# Patient Record
Sex: FEMALE | Race: WHITE | Employment: UNEMPLOYED | URBAN - METROPOLITAN AREA
[De-identification: names, ages, dates, MRNs, and addresses within clinical notes are randomized per-mention and may not be internally consistent; named-entity substitution may affect disease eponyms.]

---

## 2018-05-02 ENCOUNTER — OFFICE VISIT (OUTPATIENT)
Dept: FAMILY MEDICINE CLINIC | Age: 68
End: 2018-05-02
Payer: MEDICARE

## 2018-05-02 VITALS
SYSTOLIC BLOOD PRESSURE: 124 MMHG | OXYGEN SATURATION: 96 % | BODY MASS INDEX: 26.4 KG/M2 | RESPIRATION RATE: 12 BRPM | DIASTOLIC BLOOD PRESSURE: 72 MMHG | HEIGHT: 66 IN | HEART RATE: 64 BPM | WEIGHT: 164.25 LBS | TEMPERATURE: 96.8 F

## 2018-05-02 DIAGNOSIS — Z01.89 ROUTINE LAB DRAW: ICD-10-CM

## 2018-05-02 DIAGNOSIS — M25.552 LEFT HIP PAIN: Primary | ICD-10-CM

## 2018-05-02 DIAGNOSIS — I49.3 PVC (PREMATURE VENTRICULAR CONTRACTION): ICD-10-CM

## 2018-05-02 DIAGNOSIS — Z13.820 SCREENING FOR OSTEOPOROSIS: ICD-10-CM

## 2018-05-02 DIAGNOSIS — Z13.220 SCREENING CHOLESTEROL LEVEL: ICD-10-CM

## 2018-05-02 DIAGNOSIS — M70.62 TROCHANTERIC BURSITIS OF LEFT HIP: ICD-10-CM

## 2018-05-02 DIAGNOSIS — Z12.31 ENCOUNTER FOR SCREENING MAMMOGRAM FOR BREAST CANCER: ICD-10-CM

## 2018-05-02 DIAGNOSIS — Z12.11 SCREEN FOR COLON CANCER: ICD-10-CM

## 2018-05-02 DIAGNOSIS — Z98.890 H/O PERCUTANEOUS LEFT HEART CATHETERIZATION: ICD-10-CM

## 2018-05-02 DIAGNOSIS — M81.0 AGE-RELATED OSTEOPOROSIS WITHOUT CURRENT PATHOLOGICAL FRACTURE: ICD-10-CM

## 2018-05-02 DIAGNOSIS — E78.5 DYSLIPIDEMIA: ICD-10-CM

## 2018-05-02 PROCEDURE — 3017F COLORECTAL CA SCREEN DOC REV: CPT | Performed by: FAMILY MEDICINE

## 2018-05-02 PROCEDURE — 99204 OFFICE O/P NEW MOD 45 MIN: CPT | Performed by: FAMILY MEDICINE

## 2018-05-02 PROCEDURE — G8427 DOCREV CUR MEDS BY ELIG CLIN: HCPCS | Performed by: FAMILY MEDICINE

## 2018-05-02 PROCEDURE — 1123F ACP DISCUSS/DSCN MKR DOCD: CPT | Performed by: FAMILY MEDICINE

## 2018-05-02 PROCEDURE — G8419 CALC BMI OUT NRM PARAM NOF/U: HCPCS | Performed by: FAMILY MEDICINE

## 2018-05-02 PROCEDURE — 1036F TOBACCO NON-USER: CPT | Performed by: FAMILY MEDICINE

## 2018-05-02 PROCEDURE — G8400 PT W/DXA NO RESULTS DOC: HCPCS | Performed by: FAMILY MEDICINE

## 2018-05-02 PROCEDURE — 1090F PRES/ABSN URINE INCON ASSESS: CPT | Performed by: FAMILY MEDICINE

## 2018-05-02 PROCEDURE — 4040F PNEUMOC VAC/ADMIN/RCVD: CPT | Performed by: FAMILY MEDICINE

## 2018-05-02 RX ORDER — MELOXICAM 15 MG/1
15 TABLET ORAL DAILY
Qty: 30 TABLET | Refills: 3 | Status: SHIPPED | OUTPATIENT
Start: 2018-05-02

## 2018-05-02 ASSESSMENT — PATIENT HEALTH QUESTIONNAIRE - PHQ9
SUM OF ALL RESPONSES TO PHQ9 QUESTIONS 1 & 2: 0
SUM OF ALL RESPONSES TO PHQ QUESTIONS 1-9: 0
1. LITTLE INTEREST OR PLEASURE IN DOING THINGS: 0
2. FEELING DOWN, DEPRESSED OR HOPELESS: 0

## 2018-05-07 PROBLEM — E78.5 DYSLIPIDEMIA: Status: ACTIVE | Noted: 2018-05-07

## 2018-05-07 PROBLEM — M70.62 TROCHANTERIC BURSITIS OF LEFT HIP: Status: ACTIVE | Noted: 2018-05-07

## 2018-05-17 ENCOUNTER — HOSPITAL ENCOUNTER (OUTPATIENT)
Dept: WOMENS IMAGING | Age: 68
Discharge: HOME | End: 2018-05-19
Payer: MEDICARE

## 2018-05-17 ENCOUNTER — HOSPITAL ENCOUNTER (OUTPATIENT)
Dept: LAB | Age: 68
Discharge: HOME | End: 2018-05-17
Payer: MEDICARE

## 2018-05-17 DIAGNOSIS — R92.8 ABNORMAL MAMMOGRAM: Primary | ICD-10-CM

## 2018-05-17 DIAGNOSIS — M81.0 AGE-RELATED OSTEOPOROSIS WITHOUT CURRENT PATHOLOGICAL FRACTURE: ICD-10-CM

## 2018-05-17 DIAGNOSIS — Z12.31 ENCOUNTER FOR SCREENING MAMMOGRAM FOR BREAST CANCER: ICD-10-CM

## 2018-05-17 LAB
ALBUMIN SERPL-MCNC: 4.6 G/DL (ref 3.9–4.9)
ALP BLD-CCNC: 85 U/L (ref 40–130)
ALT SERPL-CCNC: 23 U/L (ref 0–33)
ANION GAP SERPL CALCULATED.3IONS-SCNC: 18 MEQ/L (ref 7–13)
AST SERPL-CCNC: 20 U/L (ref 0–35)
BASOPHILS ABSOLUTE: 0 K/UL (ref 0–0.2)
BASOPHILS RELATIVE PERCENT: 0.7 %
BILIRUB SERPL-MCNC: 0.4 MG/DL (ref 0–1.2)
BUN BLDV-MCNC: 16 MG/DL (ref 8–23)
CALCIUM SERPL-MCNC: 9 MG/DL (ref 8.6–10.2)
CHLORIDE BLD-SCNC: 100 MEQ/L (ref 98–107)
CHOLESTEROL, TOTAL: 241 MG/DL (ref 0–199)
CO2: 23 MEQ/L (ref 22–29)
CREAT SERPL-MCNC: 0.59 MG/DL (ref 0.5–0.9)
EOSINOPHILS ABSOLUTE: 0.1 K/UL (ref 0–0.7)
EOSINOPHILS RELATIVE PERCENT: 1.6 %
GFR AFRICAN AMERICAN: >60
GFR NON-AFRICAN AMERICAN: >60
GLOBULIN: 2.4 G/DL (ref 2.3–3.5)
GLUCOSE BLD-MCNC: 74 MG/DL (ref 74–109)
HCT VFR BLD CALC: 42.9 % (ref 37–47)
HDLC SERPL-MCNC: 81 MG/DL (ref 40–59)
HEMOGLOBIN: 14.4 G/DL (ref 12–16)
LDL CHOLESTEROL CALCULATED: 148 MG/DL (ref 0–129)
LYMPHOCYTES ABSOLUTE: 1.4 K/UL (ref 1–4.8)
LYMPHOCYTES RELATIVE PERCENT: 22.8 %
MCH RBC QN AUTO: 30.7 PG (ref 27–31.3)
MCHC RBC AUTO-ENTMCNC: 33.5 % (ref 33–37)
MCV RBC AUTO: 91.7 FL (ref 82–100)
MONOCYTES ABSOLUTE: 0.5 K/UL (ref 0.2–0.8)
MONOCYTES RELATIVE PERCENT: 7.7 %
NEUTROPHILS ABSOLUTE: 4.3 K/UL (ref 1.4–6.5)
NEUTROPHILS RELATIVE PERCENT: 67.2 %
PDW BLD-RTO: 13.4 % (ref 11.5–14.5)
PLATELET # BLD: 255 K/UL (ref 130–400)
POTASSIUM SERPL-SCNC: 4.1 MEQ/L (ref 3.5–5.1)
RBC # BLD: 4.67 M/UL (ref 4.2–5.4)
SODIUM BLD-SCNC: 141 MEQ/L (ref 132–144)
TOTAL PROTEIN: 7 G/DL (ref 6.4–8.1)
TRIGL SERPL-MCNC: 60 MG/DL (ref 0–200)
WBC # BLD: 6.3 K/UL (ref 4.8–10.8)

## 2018-05-17 PROCEDURE — 85025 COMPLETE CBC W/AUTO DIFF WBC: CPT

## 2018-05-17 PROCEDURE — 80061 LIPID PANEL: CPT

## 2018-05-17 PROCEDURE — 80053 COMPREHEN METABOLIC PANEL: CPT

## 2018-05-17 PROCEDURE — 77080 DXA BONE DENSITY AXIAL: CPT

## 2018-05-17 PROCEDURE — 77067 SCR MAMMO BI INCL CAD: CPT

## 2018-05-21 DIAGNOSIS — Z12.11 COLON CANCER SCREENING: Primary | ICD-10-CM

## 2018-05-24 ENCOUNTER — OFFICE VISIT (OUTPATIENT)
Dept: FAMILY MEDICINE CLINIC | Age: 68
End: 2018-05-24
Payer: MEDICARE

## 2018-05-24 VITALS
DIASTOLIC BLOOD PRESSURE: 70 MMHG | HEART RATE: 58 BPM | HEIGHT: 66 IN | WEIGHT: 168.3 LBS | TEMPERATURE: 96.6 F | BODY MASS INDEX: 27.05 KG/M2 | OXYGEN SATURATION: 96 % | SYSTOLIC BLOOD PRESSURE: 110 MMHG

## 2018-05-24 DIAGNOSIS — M70.62 TROCHANTERIC BURSITIS OF LEFT HIP: Primary | ICD-10-CM

## 2018-05-24 PROCEDURE — 1036F TOBACCO NON-USER: CPT | Performed by: FAMILY MEDICINE

## 2018-05-24 PROCEDURE — 4040F PNEUMOC VAC/ADMIN/RCVD: CPT | Performed by: FAMILY MEDICINE

## 2018-05-24 PROCEDURE — G8419 CALC BMI OUT NRM PARAM NOF/U: HCPCS | Performed by: FAMILY MEDICINE

## 2018-05-24 PROCEDURE — 1123F ACP DISCUSS/DSCN MKR DOCD: CPT | Performed by: FAMILY MEDICINE

## 2018-05-24 PROCEDURE — 1090F PRES/ABSN URINE INCON ASSESS: CPT | Performed by: FAMILY MEDICINE

## 2018-05-24 PROCEDURE — 3017F COLORECTAL CA SCREEN DOC REV: CPT | Performed by: FAMILY MEDICINE

## 2018-05-24 PROCEDURE — G8399 PT W/DXA RESULTS DOCUMENT: HCPCS | Performed by: FAMILY MEDICINE

## 2018-05-24 PROCEDURE — G8427 DOCREV CUR MEDS BY ELIG CLIN: HCPCS | Performed by: FAMILY MEDICINE

## 2018-05-24 PROCEDURE — 99213 OFFICE O/P EST LOW 20 MIN: CPT | Performed by: FAMILY MEDICINE

## 2018-06-11 ENCOUNTER — APPOINTMENT (OUTPATIENT)
Dept: ULTRASOUND IMAGING | Age: 68
End: 2018-06-11
Payer: MEDICARE

## 2018-06-11 ENCOUNTER — HOSPITAL ENCOUNTER (OUTPATIENT)
Dept: WOMENS IMAGING | Age: 68
Discharge: HOME | End: 2018-06-13
Payer: MEDICARE

## 2018-06-11 DIAGNOSIS — R92.8 ABNORMAL MAMMOGRAM: ICD-10-CM

## 2018-06-11 PROCEDURE — 77065 DX MAMMO INCL CAD UNI: CPT

## 2018-06-27 ENCOUNTER — OFFICE VISIT (OUTPATIENT)
Dept: FAMILY MEDICINE CLINIC | Age: 68
End: 2018-06-27
Payer: MEDICARE

## 2018-06-27 VITALS
TEMPERATURE: 96.6 F | SYSTOLIC BLOOD PRESSURE: 122 MMHG | OXYGEN SATURATION: 97 % | BODY MASS INDEX: 27.08 KG/M2 | WEIGHT: 168.5 LBS | HEIGHT: 66 IN | HEART RATE: 68 BPM | RESPIRATION RATE: 12 BRPM | DIASTOLIC BLOOD PRESSURE: 66 MMHG

## 2018-06-27 DIAGNOSIS — L03.116 LEFT LEG CELLULITIS: Primary | ICD-10-CM

## 2018-06-27 DIAGNOSIS — R10.32 LEFT INGUINAL PAIN: ICD-10-CM

## 2018-06-27 LAB
BILIRUBIN, POC: NORMAL
BLOOD URINE, POC: NORMAL
CLARITY, POC: CLEAR
COLOR, POC: NORMAL
GLUCOSE URINE, POC: NORMAL
KETONES, POC: NORMAL
LEUKOCYTE EST, POC: NORMAL
NITRITE, POC: NORMAL
PH, POC: 6
PROTEIN, POC: NORMAL
SPECIFIC GRAVITY, POC: 1.02
UROBILINOGEN, POC: NORMAL

## 2018-06-27 PROCEDURE — 1090F PRES/ABSN URINE INCON ASSESS: CPT | Performed by: FAMILY MEDICINE

## 2018-06-27 PROCEDURE — 99213 OFFICE O/P EST LOW 20 MIN: CPT | Performed by: FAMILY MEDICINE

## 2018-06-27 PROCEDURE — 1123F ACP DISCUSS/DSCN MKR DOCD: CPT | Performed by: FAMILY MEDICINE

## 2018-06-27 PROCEDURE — G8419 CALC BMI OUT NRM PARAM NOF/U: HCPCS | Performed by: FAMILY MEDICINE

## 2018-06-27 PROCEDURE — 3017F COLORECTAL CA SCREEN DOC REV: CPT | Performed by: FAMILY MEDICINE

## 2018-06-27 PROCEDURE — G8399 PT W/DXA RESULTS DOCUMENT: HCPCS | Performed by: FAMILY MEDICINE

## 2018-06-27 PROCEDURE — 4040F PNEUMOC VAC/ADMIN/RCVD: CPT | Performed by: FAMILY MEDICINE

## 2018-06-27 PROCEDURE — 81003 URINALYSIS AUTO W/O SCOPE: CPT | Performed by: FAMILY MEDICINE

## 2018-06-27 PROCEDURE — 1036F TOBACCO NON-USER: CPT | Performed by: FAMILY MEDICINE

## 2018-06-27 PROCEDURE — G8427 DOCREV CUR MEDS BY ELIG CLIN: HCPCS | Performed by: FAMILY MEDICINE

## 2018-06-27 RX ORDER — CEPHALEXIN 500 MG/1
500 CAPSULE ORAL 3 TIMES DAILY
Qty: 30 CAPSULE | Refills: 0 | Status: SHIPPED | OUTPATIENT
Start: 2018-06-27 | End: 2018-07-07

## 2018-09-06 ENCOUNTER — OFFICE VISIT (OUTPATIENT)
Dept: FAMILY MEDICINE CLINIC | Age: 68
End: 2018-09-06
Payer: MEDICARE

## 2018-09-06 VITALS
HEIGHT: 66 IN | DIASTOLIC BLOOD PRESSURE: 80 MMHG | TEMPERATURE: 96.6 F | SYSTOLIC BLOOD PRESSURE: 122 MMHG | RESPIRATION RATE: 12 BRPM | BODY MASS INDEX: 25.72 KG/M2 | HEART RATE: 66 BPM | OXYGEN SATURATION: 97 % | WEIGHT: 160.06 LBS

## 2018-09-06 DIAGNOSIS — E78.5 DYSLIPIDEMIA: ICD-10-CM

## 2018-09-06 DIAGNOSIS — R00.2 PALPITATIONS: ICD-10-CM

## 2018-09-06 DIAGNOSIS — R00.2 PALPITATIONS: Primary | ICD-10-CM

## 2018-09-06 LAB
T4 FREE: 1.35 NG/DL (ref 0.93–1.7)
TSH SERPL DL<=0.05 MIU/L-ACNC: 1.52 UIU/ML (ref 0.27–4.2)

## 2018-09-06 PROCEDURE — 1036F TOBACCO NON-USER: CPT | Performed by: FAMILY MEDICINE

## 2018-09-06 PROCEDURE — 1090F PRES/ABSN URINE INCON ASSESS: CPT | Performed by: FAMILY MEDICINE

## 2018-09-06 PROCEDURE — 93000 ELECTROCARDIOGRAM COMPLETE: CPT | Performed by: FAMILY MEDICINE

## 2018-09-06 PROCEDURE — 99214 OFFICE O/P EST MOD 30 MIN: CPT | Performed by: FAMILY MEDICINE

## 2018-09-06 PROCEDURE — 1101F PT FALLS ASSESS-DOCD LE1/YR: CPT | Performed by: FAMILY MEDICINE

## 2018-09-06 PROCEDURE — G8427 DOCREV CUR MEDS BY ELIG CLIN: HCPCS | Performed by: FAMILY MEDICINE

## 2018-09-06 PROCEDURE — G8419 CALC BMI OUT NRM PARAM NOF/U: HCPCS | Performed by: FAMILY MEDICINE

## 2018-09-06 PROCEDURE — 1123F ACP DISCUSS/DSCN MKR DOCD: CPT | Performed by: FAMILY MEDICINE

## 2018-09-06 PROCEDURE — 4040F PNEUMOC VAC/ADMIN/RCVD: CPT | Performed by: FAMILY MEDICINE

## 2018-09-06 PROCEDURE — 3017F COLORECTAL CA SCREEN DOC REV: CPT | Performed by: FAMILY MEDICINE

## 2018-09-06 PROCEDURE — G8399 PT W/DXA RESULTS DOCUMENT: HCPCS | Performed by: FAMILY MEDICINE

## 2018-09-06 NOTE — PROGRESS NOTES
Subjective:      Patient ID: Sofiya Santiago is a 78 y.o. female. Chief Complaint   Patient presents with    Palpitations     She reports this is an ongoing issue for her entire life. History of PVCs. This episode onset  was about 3 weeks ago. Ocurred intermittently and has since resolved. Denies chest pain. Has one small episode of dizziness. HPI  Here today for palpitations she reports as being an ongoing issue for her entire life she had a history of PVCs in the past but seems to feel the morning last 3-4 weeks but occur intermittently but lately has been more common denies any chest habitus    Never wants to get small episodes of dizziness but nothing regularly         Eating drinking well taking all same medications that she has been not doing any Sudafed cough and cold          Allergies   Allergen Reactions    Codeine Nausea Only     Outpatient Encounter Prescriptions as of 9/6/2018   Medication Sig Dispense Refill    fluocinonide (LIDEX) 0.05 % cream       Boswellia Octavia (BOSWELLIA PO) Take 1 tablet by mouth daily      Multiple Vitamins-Minerals (MULTIVITAMIN GUMMIES ADULT PO) Take 2 each by mouth daily      Iodine, Kelp, (KELP PO) Take 1 capsule by mouth daily      TURMERIC PO Take 1 tablet by mouth daily      Glucosamine-MSM-Hyaluronic Acd (JOINT HEALTH PO) Take 1 tablet by mouth daily      Calcium Citrate-Vitamin D (CALCIUM CITRATE + D PO) Take 1 tablet by mouth daily      meloxicam (MOBIC) 15 MG tablet Take 1 tablet by mouth daily 30 tablet 3    [DISCONTINUED] UNABLE TO FIND Bladderwrack 580 mg capsule. 1 daily       No facility-administered encounter medications on file as of 9/6/2018. Social History     Social History    Marital status:      Spouse name: N/A    Number of children: N/A    Years of education: N/A     Occupational History    Not on file.      Social History Main Topics    Smoking status: Never Smoker    Smokeless tobacco: Never Used    Alcohol use No Skin- no lesions noted       Assessment:       Diagnosis Orders   1. Palpitations  EKG 12 lead unit performed    TSH without Reflex    T4, Free   2. Dyslipidemia               Plan:        No orders of the defined types were placed in this encounter. Orders Placed This Encounter   Procedures    TSH without Reflex     Standing Status:   Future     Number of Occurrences:   1     Standing Expiration Date:   9/6/2019    T4, Free     Standing Status:   Future     Number of Occurrences:   1     Standing Expiration Date:   9/6/2019    EKG 12 lead unit performed     Order Specific Question:   Reason for Exam?     Answer:   Irregular heart rate           Health Maintenance Due   Topic Date Due    Hepatitis C screen  1950    Flu vaccine (1) 09/01/2018        EKG looks okay we'll check her thyroid status      And also like her to add some magnesium 400 mg at night so she doesn't this things worsen or change she'll let us know         Controlled Substances Monitoring:     No flowsheet data found.         If anything worsens or changes please call us at once , follow-up in the office as planned,

## 2019-04-24 ENCOUNTER — TELEPHONE (OUTPATIENT)
Dept: FAMILY MEDICINE CLINIC | Age: 69
End: 2019-04-24

## 2019-04-24 ENCOUNTER — TELEPHONE (OUTPATIENT)
Dept: ADMINISTRATIVE | Age: 69
End: 2019-04-24

## 2019-04-24 NOTE — TELEPHONE ENCOUNTER
Spoke with patient. Pt. received letter regarding doctor leaving [facility]. Pt. is following doctor to [facility]. Pt. asked for me to mail a release of medical information. I did this today.

## 2019-08-15 ENCOUNTER — HOSPITAL ENCOUNTER (OUTPATIENT)
Dept: CARDIAC CATH/INVASIVE PROCEDURES | Age: 69
Discharge: HOME | End: 2019-08-15
Attending: INTERNAL MEDICINE | Admitting: INTERNAL MEDICINE
Payer: MEDICARE

## 2019-08-15 PROCEDURE — 2580000003 HC RX 258: Performed by: INTERNAL MEDICINE

## 2019-08-15 PROCEDURE — 6370000000 HC RX 637 (ALT 250 FOR IP)

## 2019-08-15 PROCEDURE — 33285 INSJ SUBQ CAR RHYTHM MNTR: CPT | Performed by: INTERNAL MEDICINE

## 2019-08-15 PROCEDURE — C1764 EVENT RECORDER, CARDIAC: HCPCS

## 2019-08-15 PROCEDURE — 6360000002 HC RX W HCPCS

## 2019-08-15 PROCEDURE — 6360000002 HC RX W HCPCS: Performed by: INTERNAL MEDICINE

## 2019-08-15 RX ORDER — CALCIUM CARBONATE 260MG(650)
1 TABLET,CHEWABLE ORAL DAILY
COMMUNITY

## 2019-08-15 RX ORDER — ACETAMINOPHEN 325 MG/1
650 TABLET ORAL EVERY 4 HOURS PRN
Status: DISCONTINUED | OUTPATIENT
Start: 2019-08-15 | End: 2019-08-15 | Stop reason: HOSPADM

## 2019-08-15 RX ORDER — SODIUM CHLORIDE 0.9 % (FLUSH) 0.9 %
10 SYRINGE (ML) INJECTION PRN
Status: DISCONTINUED | OUTPATIENT
Start: 2019-08-15 | End: 2019-08-15 | Stop reason: HOSPADM

## 2019-08-15 RX ORDER — SODIUM CHLORIDE 9 MG/ML
INJECTION, SOLUTION INTRAVENOUS CONTINUOUS
Status: DISCONTINUED | OUTPATIENT
Start: 2019-08-15 | End: 2019-08-15 | Stop reason: HOSPADM

## 2019-08-15 RX ORDER — SODIUM CHLORIDE 0.9 % (FLUSH) 0.9 %
10 SYRINGE (ML) INJECTION EVERY 12 HOURS SCHEDULED
Status: DISCONTINUED | OUTPATIENT
Start: 2019-08-15 | End: 2019-08-15 | Stop reason: HOSPADM

## 2019-08-15 RX ORDER — ONDANSETRON 2 MG/ML
4 INJECTION INTRAMUSCULAR; INTRAVENOUS EVERY 6 HOURS PRN
Status: DISCONTINUED | OUTPATIENT
Start: 2019-08-15 | End: 2019-08-15 | Stop reason: HOSPADM

## 2019-08-15 RX ORDER — B-COMPLEX WITH VITAMIN C
1 TABLET ORAL DAILY
COMMUNITY

## 2019-08-15 RX ADMIN — VANCOMYCIN HYDROCHLORIDE 1000 MG: 1 INJECTION, POWDER, LYOPHILIZED, FOR SOLUTION INTRAVENOUS at 10:38

## 2019-08-15 RX ADMIN — SODIUM CHLORIDE: 9 INJECTION, SOLUTION INTRAVENOUS at 07:01

## 2019-08-15 NOTE — PROGRESS NOTES
Vancomycin finished. Dressing remains dry and intact with no bleeding or hematoma.   Patient ready for discharge

## 2019-08-15 NOTE — PROGRESS NOTES
Arrived to pre/post from the cath lab and report from [facility]. Mid sternal chest dressing dry and intact with no bleeding or hematoma. Attached to monitor. medtronic at bedside going over equipment at bedside. Family at bedside and Dr. Kirk Plummer reviewed results with patient and family. Taking food and fluids.   Will continue to monitor

## 2019-08-15 NOTE — H&P
[address]                      [address], [address] [facility]                              HISTORY AND PHYSICAL    PATIENT NAME: Gordo Chamorro                     :        1950  MED REC NO:   [id number]                            ROOM:  ACCOUNT NO:   [de-identified]                           ADMIT DATE: 08/15/2019  PROVIDER:     Earnest Foster MD    HISTORY OF PRESENT ILLNESS:  A 71-year-old female with a past medical  history of _____ for heart disease. She got three episodes of syncope  in the last five years. In , she had an episode, where she was  eating lunch and then she fell on the floor and passed out hitting her  head. She was hospitalized and taken by ambulance to the hospital,  underwent an echocardiogram, necessitating cardiac catheterization,  where all within normal limits except for minimal disease with one based  on the cardiac catheterization. In , she experienced some vertigo  and dizziness with nausea, but did not pass out. Most recently in  2019, was sitting at the dinner table and breathing while she felt  woozy and she was able to get up and go to a living room, where she laid  on the couch and she fell asleep and when she awakened, her symptoms  were resolved. Otherwise, she has been active. EKG on admission shows  sinus rhythm.  _____ show a 15-beat of what looks like a wide complex  tachycardia, felt to be either atrial tachycardia or atrial fibrillation  with aberrancy at a rate of 127 beats per minute. REVIEW OF SYSTEMS:  Per HPI. The rest of them were negative. PAST MEDICAL HISTORY:  As described above. FAMILY HISTORY:  Positive for hypertension, myocardial infarction. SOCIAL HISTORY:  Never a smoker. Occasional alcohol. No illicit drugs. MEDICATIONS:  Please see MAR. ALLERGIES:  To CODEINE.     PHYSICAL EXAMINATION:  VITAL SIGNS:  Blood pressure of 138/70 mmHg, heart rate of 56 beats per  minute,

## 2019-08-15 NOTE — PROCEDURES
patient left the EP laboratory in stable condition. COMPLICATIONS:  The patient tolerated the procedure without any  complications or incidents. No complications occurred. SUMMARY:  Successful implantation of a loop recorder. FOLLOWUP:  1. The patient will be discharged on the same day of the procedure if  recovery parameters are appropriate. 2.  Follow up with the device clinic as scheduled. 3.  Follow up in our office in seven days for wound assessment. The  patient was instructed for bleeding, swelling, or signs of infection.       Minnie Mccray MD    D: 08/15/2019 17:07:37       T: 08/15/2019 18:09:05     [email or web address]  Job#: [id number]     Doc#: [id number]    CC:

## 2019-11-13 ENCOUNTER — HOSPITAL ENCOUNTER (OUTPATIENT)
Dept: CARDIOLOGY | Age: 69
Discharge: HOME | End: 2019-11-13
Payer: MEDICARE

## 2019-11-13 ENCOUNTER — HOSPITAL ENCOUNTER (OUTPATIENT)
Dept: CARDIAC CATH/INVASIVE PROCEDURES | Age: 69
Discharge: HOME | End: 2019-11-13
Attending: INTERNAL MEDICINE | Admitting: INTERNAL MEDICINE
Payer: MEDICARE

## 2019-11-13 VITALS
SYSTOLIC BLOOD PRESSURE: 115 MMHG | DIASTOLIC BLOOD PRESSURE: 60 MMHG | HEIGHT: 68 IN | WEIGHT: 160 LBS | BODY MASS INDEX: 24.25 KG/M2 | HEART RATE: 65 BPM | RESPIRATION RATE: 16 BRPM | OXYGEN SATURATION: 96 %

## 2019-11-13 PROBLEM — I47.20 VT (VENTRICULAR TACHYCARDIA): Status: ACTIVE | Noted: 2019-11-13

## 2019-11-13 LAB
ANION GAP SERPL CALCULATED.3IONS-SCNC: 10 MEQ/L (ref 9–15)
BUN BLDV-MCNC: 17 MG/DL (ref 8–23)
CALCIUM SERPL-MCNC: 9.6 MG/DL (ref 8.5–9.9)
CHLORIDE BLD-SCNC: 102 MEQ/L (ref 95–107)
CO2: 26 MEQ/L (ref 20–31)
CREAT SERPL-MCNC: 0.49 MG/DL (ref 0.5–0.9)
EKG ATRIAL RATE: 62 BPM
EKG P AXIS: 61 DEGREES
EKG P-R INTERVAL: 182 MS
EKG Q-T INTERVAL: 470 MS
EKG QRS DURATION: 86 MS
EKG QTC CALCULATION (BAZETT): 477 MS
EKG R AXIS: 30 DEGREES
EKG T AXIS: 37 DEGREES
EKG VENTRICULAR RATE: 62 BPM
GFR AFRICAN AMERICAN: >60
GFR NON-AFRICAN AMERICAN: >60
GLUCOSE BLD-MCNC: 89 MG/DL (ref 70–99)
HCT VFR BLD CALC: 41.3 % (ref 37–47)
HEMOGLOBIN: 14.1 G/DL (ref 12–16)
MCH RBC QN AUTO: 31.1 PG (ref 27–31.3)
MCHC RBC AUTO-ENTMCNC: 34.1 % (ref 33–37)
MCV RBC AUTO: 91.4 FL (ref 82–100)
PDW BLD-RTO: 13.5 % (ref 11.5–14.5)
PLATELET # BLD: 260 K/UL (ref 130–400)
POTASSIUM SERPL-SCNC: 3.9 MEQ/L (ref 3.4–4.9)
RBC # BLD: 4.52 M/UL (ref 4.2–5.4)
SODIUM BLD-SCNC: 138 MEQ/L (ref 135–144)
WBC # BLD: 6.2 K/UL (ref 4.8–10.8)

## 2019-11-13 PROCEDURE — C1760 CLOSURE DEV, VASC: HCPCS

## 2019-11-13 PROCEDURE — 6360000004 HC RX CONTRAST MEDICATION: Performed by: INTERNAL MEDICINE

## 2019-11-13 PROCEDURE — 80048 BASIC METABOLIC PNL TOTAL CA: CPT

## 2019-11-13 PROCEDURE — 2580000003 HC RX 258

## 2019-11-13 PROCEDURE — 93299 HC INTERROG REMOTE LOOP RECORDER: CPT

## 2019-11-13 PROCEDURE — C1769 GUIDE WIRE: HCPCS

## 2019-11-13 PROCEDURE — 93458 L HRT ARTERY/VENTRICLE ANGIO: CPT | Performed by: INTERNAL MEDICINE

## 2019-11-13 PROCEDURE — 6360000002 HC RX W HCPCS

## 2019-11-13 PROCEDURE — 85027 COMPLETE CBC AUTOMATED: CPT

## 2019-11-13 PROCEDURE — 2500000003 HC RX 250 WO HCPCS

## 2019-11-13 PROCEDURE — C1894 INTRO/SHEATH, NON-LASER: HCPCS

## 2019-11-13 PROCEDURE — 2580000003 HC RX 258: Performed by: INTERNAL MEDICINE

## 2019-11-13 PROCEDURE — 2709999900 HC NON-CHARGEABLE SUPPLY

## 2019-11-13 RX ORDER — SODIUM CHLORIDE 0.9 % (FLUSH) 0.9 %
10 SYRINGE (ML) INJECTION PRN
Status: CANCELLED | OUTPATIENT
Start: 2019-11-13

## 2019-11-13 RX ORDER — SODIUM CHLORIDE 9 MG/ML
INJECTION, SOLUTION INTRAVENOUS CONTINUOUS
Status: DISCONTINUED | OUTPATIENT
Start: 2019-11-13 | End: 2019-11-13 | Stop reason: HOSPADM

## 2019-11-13 RX ORDER — SODIUM CHLORIDE 0.9 % (FLUSH) 0.9 %
10 SYRINGE (ML) INJECTION EVERY 12 HOURS SCHEDULED
Status: DISCONTINUED | OUTPATIENT
Start: 2019-11-13 | End: 2019-11-13 | Stop reason: HOSPADM

## 2019-11-13 RX ORDER — ACETAMINOPHEN 325 MG/1
650 TABLET ORAL EVERY 4 HOURS PRN
Status: CANCELLED | OUTPATIENT
Start: 2019-11-13

## 2019-11-13 RX ORDER — ALPRAZOLAM 0.5 MG/1
0.5 TABLET ORAL
Status: DISCONTINUED | OUTPATIENT
Start: 2019-11-13 | End: 2019-11-13 | Stop reason: HOSPADM

## 2019-11-13 RX ORDER — SODIUM CHLORIDE 0.9 % (FLUSH) 0.9 %
10 SYRINGE (ML) INJECTION EVERY 12 HOURS SCHEDULED
Status: CANCELLED | OUTPATIENT
Start: 2019-11-13

## 2019-11-13 RX ADMIN — IOPAMIDOL 80 ML: 612 INJECTION, SOLUTION INTRAVENOUS at 14:25

## 2019-11-13 RX ADMIN — SODIUM CHLORIDE: 9 INJECTION, SOLUTION INTRAVENOUS at 12:16

## 2020-01-27 ENCOUNTER — APPOINTMENT (OUTPATIENT)
Dept: GENERAL RADIOLOGY | Age: 70
End: 2020-01-27
Attending: INTERNAL MEDICINE
Payer: MEDICARE

## 2020-01-27 ENCOUNTER — HOSPITAL ENCOUNTER (OUTPATIENT)
Dept: CARDIAC CATH/INVASIVE PROCEDURES | Age: 70
Setting detail: OBSERVATION
LOS: 1 days | Discharge: HOME | End: 2020-01-28
Attending: INTERNAL MEDICINE | Admitting: INTERNAL MEDICINE
Payer: MEDICARE

## 2020-01-27 PROCEDURE — 2580000003 HC RX 258

## 2020-01-27 PROCEDURE — 93620 COMP EP EVL R AT VEN PAC&REC: CPT | Performed by: INTERNAL MEDICINE

## 2020-01-27 PROCEDURE — 93622 COMP EP EVAL L VENTR PAC&REC: CPT | Performed by: INTERNAL MEDICINE

## 2020-01-27 PROCEDURE — C1894 INTRO/SHEATH, NON-LASER: HCPCS

## 2020-01-27 PROCEDURE — 6360000002 HC RX W HCPCS

## 2020-01-27 PROCEDURE — G0378 HOSPITAL OBSERVATION PER HR: HCPCS

## 2020-01-27 PROCEDURE — 93005 ELECTROCARDIOGRAM TRACING: CPT | Performed by: INTERNAL MEDICINE

## 2020-01-27 PROCEDURE — 2709999900 HC NON-CHARGEABLE SUPPLY

## 2020-01-27 PROCEDURE — C1721 AICD, DUAL CHAMBER: HCPCS

## 2020-01-27 PROCEDURE — 2500000003 HC RX 250 WO HCPCS

## 2020-01-27 PROCEDURE — 33249 INSJ/RPLCMT DEFIB W/LEAD(S): CPT | Performed by: INTERNAL MEDICINE

## 2020-01-27 PROCEDURE — 71045 X-RAY EXAM CHEST 1 VIEW: CPT

## 2020-01-27 PROCEDURE — C1730 CATH, EP, 19 OR FEW ELECT: HCPCS

## 2020-01-27 PROCEDURE — C1777 LEAD, AICD, ENDO SINGLE COIL: HCPCS

## 2020-01-27 PROCEDURE — 2780000010 HC IMPLANT OTHER

## 2020-01-27 PROCEDURE — 6370000000 HC RX 637 (ALT 250 FOR IP)

## 2020-01-27 PROCEDURE — 2580000003 HC RX 258: Performed by: INTERNAL MEDICINE

## 2020-01-27 PROCEDURE — C1898 LEAD, PMKR, OTHER THAN TRANS: HCPCS

## 2020-01-27 RX ORDER — ONDANSETRON 2 MG/ML
4 INJECTION INTRAMUSCULAR; INTRAVENOUS EVERY 6 HOURS PRN
Status: DISCONTINUED | OUTPATIENT
Start: 2020-01-27 | End: 2020-01-28 | Stop reason: HOSPADM

## 2020-01-27 RX ORDER — CALCIUM CARBONATE 260MG(650)
1 TABLET,CHEWABLE ORAL DAILY
Status: DISCONTINUED | OUTPATIENT
Start: 2020-01-28 | End: 2020-01-28 | Stop reason: HOSPADM

## 2020-01-27 RX ORDER — KETOROLAC TROMETHAMINE 15 MG/ML
15 INJECTION, SOLUTION INTRAMUSCULAR; INTRAVENOUS EVERY 6 HOURS
Status: DISCONTINUED | OUTPATIENT
Start: 2020-01-27 | End: 2020-01-28 | Stop reason: HOSPADM

## 2020-01-27 RX ORDER — SODIUM CHLORIDE 9 MG/ML
INJECTION, SOLUTION INTRAVENOUS CONTINUOUS
Status: DISCONTINUED | OUTPATIENT
Start: 2020-01-27 | End: 2020-01-28 | Stop reason: HOSPADM

## 2020-01-27 RX ORDER — SODIUM CHLORIDE 0.9 % (FLUSH) 0.9 %
10 SYRINGE (ML) INJECTION PRN
Status: DISCONTINUED | OUTPATIENT
Start: 2020-01-27 | End: 2020-01-28 | Stop reason: HOSPADM

## 2020-01-27 RX ORDER — VITAMIN B COMPLEX
1 CAPSULE ORAL DAILY
Status: DISCONTINUED | OUTPATIENT
Start: 2020-01-28 | End: 2020-01-28 | Stop reason: HOSPADM

## 2020-01-27 RX ORDER — SODIUM CHLORIDE 0.9 % (FLUSH) 0.9 %
10 SYRINGE (ML) INJECTION EVERY 12 HOURS SCHEDULED
Status: DISCONTINUED | OUTPATIENT
Start: 2020-01-27 | End: 2020-01-28 | Stop reason: HOSPADM

## 2020-01-27 RX ORDER — ACETAMINOPHEN 325 MG/1
650 TABLET ORAL EVERY 4 HOURS PRN
Status: DISCONTINUED | OUTPATIENT
Start: 2020-01-27 | End: 2020-01-28 | Stop reason: HOSPADM

## 2020-01-27 RX ORDER — MELOXICAM 7.5 MG/1
15 TABLET ORAL DAILY
Status: DISCONTINUED | OUTPATIENT
Start: 2020-01-29 | End: 2020-01-28 | Stop reason: HOSPADM

## 2020-01-27 RX ADMIN — SODIUM CHLORIDE: 9 INJECTION, SOLUTION INTRAVENOUS at 13:28

## 2020-01-27 ASSESSMENT — PAIN SCALES - GENERAL: PAINLEVEL_OUTOF10: 0

## 2020-01-28 ENCOUNTER — APPOINTMENT (OUTPATIENT)
Dept: GENERAL RADIOLOGY | Age: 70
End: 2020-01-28
Attending: INTERNAL MEDICINE
Payer: MEDICARE

## 2020-01-28 VITALS
WEIGHT: 167 LBS | RESPIRATION RATE: 16 BRPM | TEMPERATURE: 97.3 F | SYSTOLIC BLOOD PRESSURE: 128 MMHG | BODY MASS INDEX: 24.73 KG/M2 | OXYGEN SATURATION: 97 % | HEIGHT: 69 IN | DIASTOLIC BLOOD PRESSURE: 48 MMHG | HEART RATE: 70 BPM

## 2020-01-28 LAB
EKG ATRIAL RATE: 66 BPM
EKG ATRIAL RATE: 68 BPM
EKG P AXIS: 43 DEGREES
EKG P AXIS: 77 DEGREES
EKG P-R INTERVAL: 168 MS
EKG P-R INTERVAL: 194 MS
EKG Q-T INTERVAL: 442 MS
EKG Q-T INTERVAL: 452 MS
EKG QRS DURATION: 84 MS
EKG QRS DURATION: 92 MS
EKG QTC CALCULATION (BAZETT): 469 MS
EKG QTC CALCULATION (BAZETT): 473 MS
EKG R AXIS: 26 DEGREES
EKG R AXIS: 61 DEGREES
EKG T AXIS: 65 DEGREES
EKG T AXIS: 78 DEGREES
EKG VENTRICULAR RATE: 66 BPM
EKG VENTRICULAR RATE: 68 BPM

## 2020-01-28 PROCEDURE — 96374 THER/PROPH/DIAG INJ IV PUSH: CPT

## 2020-01-28 PROCEDURE — 93283 PRGRMG EVAL IMPLANTABLE DFB: CPT

## 2020-01-28 PROCEDURE — 71046 X-RAY EXAM CHEST 2 VIEWS: CPT

## 2020-01-28 PROCEDURE — 93005 ELECTROCARDIOGRAM TRACING: CPT | Performed by: INTERNAL MEDICINE

## 2020-01-28 PROCEDURE — C1777 LEAD, AICD, ENDO SINGLE COIL: HCPCS

## 2020-01-28 PROCEDURE — 2580000003 HC RX 258: Performed by: INTERNAL MEDICINE

## 2020-01-28 PROCEDURE — G0378 HOSPITAL OBSERVATION PER HR: HCPCS

## 2020-01-28 PROCEDURE — 96376 TX/PRO/DX INJ SAME DRUG ADON: CPT

## 2020-01-28 PROCEDURE — 6370000000 HC RX 637 (ALT 250 FOR IP): Performed by: INTERNAL MEDICINE

## 2020-01-28 PROCEDURE — 6360000002 HC RX W HCPCS: Performed by: INTERNAL MEDICINE

## 2020-01-28 PROCEDURE — C1730 CATH, EP, 19 OR FEW ELECT: HCPCS

## 2020-01-28 PROCEDURE — 2709999900 HC NON-CHARGEABLE SUPPLY

## 2020-01-28 PROCEDURE — C1894 INTRO/SHEATH, NON-LASER: HCPCS

## 2020-01-28 RX ADMIN — Medication 10 ML: at 10:01

## 2020-01-28 RX ADMIN — VANCOMYCIN HYDROCHLORIDE 1000 MG: 1 INJECTION, POWDER, LYOPHILIZED, FOR SOLUTION INTRAVENOUS at 10:00

## 2020-01-28 RX ADMIN — ACETAMINOPHEN 650 MG: 325 TABLET ORAL at 00:09

## 2020-01-28 RX ADMIN — KETOROLAC TROMETHAMINE 15 MG: 15 INJECTION, SOLUTION INTRAMUSCULAR; INTRAVENOUS at 04:12

## 2020-01-28 RX ADMIN — Medication 10 ML: at 00:10

## 2020-01-28 RX ADMIN — KETOROLAC TROMETHAMINE 15 MG: 15 INJECTION, SOLUTION INTRAMUSCULAR; INTRAVENOUS at 10:00

## 2020-01-28 ASSESSMENT — PAIN SCALES - GENERAL
PAINLEVEL_OUTOF10: 4
PAINLEVEL_OUTOF10: 5
PAINLEVEL_OUTOF10: 5

## 2020-01-28 ASSESSMENT — PAIN DESCRIPTION - LOCATION
LOCATION: CHEST
LOCATION: CHEST

## 2020-01-28 ASSESSMENT — PAIN DESCRIPTION - DESCRIPTORS
DESCRIPTORS: ACHING
DESCRIPTORS: ACHING

## 2020-01-28 ASSESSMENT — PAIN DESCRIPTION - ORIENTATION
ORIENTATION: LEFT
ORIENTATION: LEFT

## 2020-01-28 ASSESSMENT — PAIN DESCRIPTION - PAIN TYPE
TYPE: SURGICAL PAIN
TYPE: SURGICAL PAIN

## 2020-01-28 ASSESSMENT — PAIN DESCRIPTION - ONSET
ONSET: GRADUAL
ONSET: GRADUAL

## 2020-01-28 NOTE — PROGRESS NOTES
Cardiology Progress Note      Rounding Cardiologist:  Orly Rubio MD  Primary Cardiologist: Dr. Kem Crews     Date:  1/28/2020  Patient:  Yesika Edwards  YOB: 1950  MRN:  [id number]       Ashlee Rossi was seen and examined today at bedside. Her overnight history is negative. No chest pain or shortness of breath. Recent Office Visit:  Jeanette Medina is a 81-year-old female presenting today accompanied by her  for further evaluation of syncope/near syncope. She was initially evaluated in May 2019. She does not have any previous history of atherosclerotic heart or valvular heart disease. She gives a history of three episodes over the past 5 years of syncope/near syncope. She notes that  in 2014 she was at the table making lunch when the \"room went round and around\" and she passed out, hitting her head. She was taken by ambulance to [facility] and underwent echocardiogram, stress testing, and a cardiac catheterization, reporting that all testing was within normal with exception of some minimal disease of one vessel on the heart cath. In 2017 she experienced vertigo with dizziness and nausea but did not pass out. More recently, in May, she was sitting at the dining table reading when and suddenly felt \"woozy. \" She was able to get up and go to the living room and fell asleep on the couch. When she awakened her symptoms had resolved. She generally is active and denies any chest pains or shortness of breath. She is aware of an occasional palpitation. She has a history of PVCs and recently wore a Biom'Up event monitor for 30 days. There were 17 triggered events. 16 had sinus rhythm with PVCs, couplets or periods of trigeminy. There was 1 triggered event on May 29 for palpitations revealing sinus rhythm with a PVC and a 15 beat run of wide complex tachycardia.   This was felt to be either atrial tachycardia or atrial hesitate to call with questions.   Electronically signed by Thiago Arias MD, [facility] on 1/28/2020 at 1:37 PM

## 2020-01-28 NOTE — FLOWSHEET NOTE
Monitor tech reports that pt is mostly NSR, occasionally A-pacing.  Electronically signed by Misha Nguyen RN on 1/28/2020 at 5:09 AM

## 2020-01-28 NOTE — PROGRESS NOTES
L subclavian and mid chest dressing clean and dry. Pt sipping on juice and eating solid foods, tolerated well.

## 2020-01-28 NOTE — BRIEF OP NOTE
Brief Postoperative Note  ______________________________________________________________    Patient: Warren Bermeo  YOB: 1950  MRN: [id number]  Date of Procedure: 01/27/20    EP study with inducible VT  Successful implantation of a dual chamber ICD    Kaycee Rodrigues MD  Date: 1/27/2020  Time: 8:17 PM

## 2020-01-28 NOTE — PROGRESS NOTES
Pt sitting up sipping on fluids and eating snack type foods. Report given to [facility] on lWT. L subclavian dressing and midchest dressing clean and dry.

## 2020-01-28 NOTE — DISCHARGE SUMMARY
or output data in the 24 hours ending 01/28/20 1337     Patient Vitals for the past 96 hrs (Last 3 readings):    Weight   01/27/20 1313 167 lb (75.8 kg)         PHYSICAL EXAM   GENERAL:  Well developed, well nourished, in no acute distress. CHEST:  Symmetric. Mild chest wall tenderness. NEURO/PSYCH:  Alert and oriented times three with approppriate behavior and responses. NECK:  Supple, no JVD, no bruit. LUNGS:  Clear to auscultation bilaterally, normal respiratory effort. HEART:  Rate and rhythm regular with no evident murmur, no gallop appreciated. There are no rubs, clicks or heaves. EXTREMITIES:  Warm with good color, no clubbing or cyanosis. There is no edema noted. PERIPHERAL VASCULAR:  Pulses present and equally palpable; 2+ throughout.        DIAGNOSTIC RESULTS   EKG:  Normal sinus rhythm  Nonspecific T wave abnormality  Abnormal ECG  When compared with ECG of 27-JAN-2020 13:06,  premature ventricular complexes are no longer present     Telemetry: normal sinus .        LAB DATA      TSH:        Lab Results   Component Value Date     TSH 1.520 09/06/2018         Lipid Profile:        Lab Results   Component Value Date     TRIG 60 05/17/2018     HDL 81 05/17/2018     LDLCALC 148 05/17/2018         RADIOLOGY      XR CHEST STANDARD (2 VW)   Final Result   SMALL RIGHT EFFUSION.  RIGHT APICAL PLEURAL MARGIN SEEN PROBABLY OF NO CLINICAL SIGNIFICANCE.           XR CHEST PORTABLE   Final Result       No acute radiographic abnormality.                        CURRENT MEDICATIONS       Scheduled Medications    sodium chloride flush  10 mL Intravenous 2 times per day    b complex vitamins  1 capsule Oral Daily    Magnesium Citrate  1 tablet Oral Daily    [START ON 1/29/2020] meloxicam  15 mg Oral Daily    sodium chloride flush  10 mL Intravenous 2 times per day    ketorolac  15 mg Intravenous Q6H            INTRAVENOUS MEDICATIONS       Infusions Meds    sodium chloride 75 mL/hr at 01/27/20 1328            ASSESSMENT      Status post EP study with inducible ventricular tachycardia.     Status post ICD implant.      Recurrent syncope.       Wide-complex tachycardia consistent with nonsustained ventricular tachycardia.       Minimal coronary artery disease based on coronary angiography November 2019-findings discussed        PLAN      OK to discharge home.      Resume home medications.      Follow-up in the device clinic as scheduled.      Follow-up scheduled with Dr. Calderon Early on 2/7/2020.         Please do not hesitate to call with questions.   Electronically signed by Chiara Escobar MD, [facility] on 1/28/2020 at 1:37 PM

## 2020-02-06 NOTE — OP NOTE
[address]                      [address], [address] [facility]                                OPERATIVE REPORT    PATIENT NAME: Que Pelaez                   :        1950  MED REC NO:   [id number]                            ROOM:       [facility]  ACCOUNT NO:   [de-identified]                           ADMIT DATE: 2020  PROVIDER:     Cece Zimmerman MD    DATE OF PROCEDURE:  2020    ADDENDUM    At the end of the procedure, 20 mL of lidocaine were used around loop  recorder pocket. An incision was made. The loop recorder was removed  intact. The skin was approximated with Dermabond and a pressure  dressing was placed. The patient left the EP laboratory in stable  condition.         Delaney Child MD    D: 2020 12:57:51       T: 2020 15:13:08     [email or web address]  Job#: [id number]     Doc#: [id number]    CC:
implanted on  01/27/2020. Location:  Right ventricular low septum. Capture:  0.7 V at 0.4 msec. Impedance:  699 ohms. EGM Amplitude:  9.1 mV. Diaphragmatic Stimulation:  None. Status:  Active. SUMMARY:  1. Inducible ventricular tachycardia obtained with programmed  stimulation with pacing from the right ventricular apex at 600 and 400  msec using a single, double, and also triple extra stimuli. 2.  Successful implantation of dual-chamber ICD. 3.  Appropriate sensing and impedances of all leads. 4.  Appropriate detection and termination of the ventricular arrhythmias  using the device. FOLLOWUP:  1. The patient will remain in the hospital overnight for telemetry  monitoring and observation with anticipated discharge the next day of  the procedure. 2.  [facility] 3.  Follow up in my office in seven days for wound assessment. 4.  The patient was inspected for bleeding, swelling, or signs of  infection. 5.  The patient was very pleased with care.         Stanislaw Mooney MD    D: 01/27/2020 20:44:27       T: 01/27/2020 22:58:11     [email or web address]  Job#: [id number]     Doc#: [id number]    CC:

## 2020-04-01 ENCOUNTER — HOSPITAL ENCOUNTER (OUTPATIENT)
Dept: CARDIOLOGY | Age: 70
Discharge: HOME | End: 2020-04-01
Payer: MEDICARE

## 2020-04-01 PROCEDURE — 93296 REM INTERROG EVL PM/IDS: CPT

## 2020-05-14 ENCOUNTER — HOSPITAL ENCOUNTER (OUTPATIENT)
Dept: GENERAL RADIOLOGY | Age: 70
Discharge: HOME | End: 2020-05-16
Payer: MEDICARE

## 2020-05-14 PROCEDURE — 71046 X-RAY EXAM CHEST 2 VIEWS: CPT

## 2020-11-30 ENCOUNTER — HOSPITAL ENCOUNTER (OUTPATIENT)
Dept: CARDIOLOGY | Age: 70
Discharge: HOME | End: 2020-11-30
Payer: MEDICARE

## 2020-11-30 PROCEDURE — 93296 REM INTERROG EVL PM/IDS: CPT

## 2021-03-01 ENCOUNTER — HOSPITAL ENCOUNTER (OUTPATIENT)
Dept: CARDIOLOGY | Age: 71
Discharge: HOME | End: 2021-03-01
Payer: MEDICARE

## 2021-03-01 PROCEDURE — 93296 REM INTERROG EVL PM/IDS: CPT

## 2021-06-03 ENCOUNTER — HOSPITAL ENCOUNTER (OUTPATIENT)
Dept: CARDIOLOGY | Age: 71
Discharge: HOME | End: 2021-06-03
Payer: MEDICARE

## 2021-06-03 PROCEDURE — 93296 REM INTERROG EVL PM/IDS: CPT

## 2021-09-07 ENCOUNTER — HOSPITAL ENCOUNTER (OUTPATIENT)
Dept: CARDIOLOGY | Age: 71
Discharge: HOME | End: 2021-09-07
Payer: MEDICARE

## 2021-09-07 PROCEDURE — 93296 REM INTERROG EVL PM/IDS: CPT

## 2022-04-05 ENCOUNTER — HOSPITAL ENCOUNTER (OUTPATIENT)
Dept: CARDIOLOGY | Age: 72
Discharge: HOME | End: 2022-04-05
Payer: MEDICARE

## 2022-04-05 PROCEDURE — 93296 REM INTERROG EVL PM/IDS: CPT

## 2022-07-07 ENCOUNTER — HOSPITAL ENCOUNTER (OUTPATIENT)
Dept: CARDIOLOGY | Age: 72
Discharge: HOME | End: 2022-07-07
Payer: MEDICARE

## 2022-07-07 PROCEDURE — 93283 PRGRMG EVAL IMPLANTABLE DFB: CPT

## 2022-10-04 ENCOUNTER — HOSPITAL ENCOUNTER (OUTPATIENT)
Dept: CARDIOLOGY | Age: 72
Discharge: HOME | End: 2022-10-04
Payer: MEDICARE

## 2022-10-04 PROCEDURE — 93296 REM INTERROG EVL PM/IDS: CPT

## 2022-10-10 LAB
ANION GAP SERPL CALCULATED.3IONS-SCNC: 12 MEQ/L (ref 9–15)
BUN BLDV-MCNC: 18 MG/DL (ref 8–23)
CALCIUM SERPL-MCNC: 9.9 MG/DL (ref 8.5–9.9)
CHLORIDE BLD-SCNC: 102 MEQ/L (ref 95–107)
CO2: 25 MEQ/L (ref 20–31)
CREAT SERPL-MCNC: 0.53 MG/DL (ref 0.5–0.9)
GFR AFRICAN AMERICAN: >60
GFR NON-AFRICAN AMERICAN: >60
GLUCOSE BLD-MCNC: 83 MG/DL (ref 70–99)
MAGNESIUM: 2.2 MG/DL (ref 1.7–2.4)
POTASSIUM SERPL-SCNC: 4.6 MEQ/L (ref 3.4–4.9)
SODIUM BLD-SCNC: 139 MEQ/L (ref 135–144)

## 2023-01-09 ENCOUNTER — HOSPITAL ENCOUNTER (OUTPATIENT)
Dept: CARDIOLOGY | Age: 73
Discharge: HOME | End: 2023-01-09
Payer: MEDICARE

## 2023-01-09 PROCEDURE — 93296 REM INTERROG EVL PM/IDS: CPT

## 2023-05-16 PROBLEM — M25.559 PAIN, HIP: Status: ACTIVE | Noted: 2023-05-16

## 2023-05-16 PROBLEM — R42 VERTIGO: Status: ACTIVE | Noted: 2023-05-16

## 2023-05-16 PROBLEM — M21.619 ASYMPTOMATIC BUNION: Status: ACTIVE | Noted: 2023-05-16

## 2023-05-16 PROBLEM — M54.16 CHRONIC LUMBAR RADICULOPATHY: Status: ACTIVE | Noted: 2023-05-16

## 2023-05-16 PROBLEM — I47.19 NARROW COMPLEX TACHYCARDIA (CMS-HCC): Status: ACTIVE | Noted: 2023-05-16

## 2023-05-16 PROBLEM — L84 CALLUS OF FOOT: Status: ACTIVE | Noted: 2023-05-16

## 2023-05-16 PROBLEM — I49.3 PVC (PREMATURE VENTRICULAR CONTRACTION): Status: ACTIVE | Noted: 2023-05-16

## 2023-05-16 PROBLEM — E78.00 HYPERCHOLESTEROLEMIA: Status: ACTIVE | Noted: 2023-05-16

## 2023-05-16 PROBLEM — Z95.818 STATUS POST PLACEMENT OF IMPLANTABLE LOOP RECORDER: Status: ACTIVE | Noted: 2023-05-16

## 2023-05-16 PROBLEM — R55 NEAR SYNCOPE: Status: ACTIVE | Noted: 2023-05-16

## 2023-05-16 PROBLEM — R00.2 PALPITATIONS: Status: ACTIVE | Noted: 2023-05-16

## 2023-05-16 PROBLEM — Z95.810 ICD (IMPLANTABLE CARDIOVERTER-DEFIBRILLATOR) IN PLACE: Status: ACTIVE | Noted: 2023-05-16

## 2023-05-16 PROBLEM — G47.33 OBSTRUCTIVE SLEEP APNEA: Status: ACTIVE | Noted: 2023-05-16

## 2023-05-16 PROBLEM — I42.8 NON-ISCHEMIC CARDIOMYOPATHY (MULTI): Status: ACTIVE | Noted: 2023-05-16

## 2023-05-16 PROBLEM — I47.20 VENTRICULAR TACHYCARDIA (MULTI): Status: ACTIVE | Noted: 2023-05-16

## 2023-05-16 PROBLEM — R07.89 ATYPICAL CHEST PAIN: Status: ACTIVE | Noted: 2023-05-16

## 2023-05-16 PROBLEM — M85.80 OSTEOPENIA: Status: ACTIVE | Noted: 2023-05-16

## 2023-05-16 PROBLEM — R06.02 SOB (SHORTNESS OF BREATH) ON EXERTION: Status: ACTIVE | Noted: 2023-05-16

## 2023-05-16 PROBLEM — M76.899 TENDONITIS OF KNEE: Status: ACTIVE | Noted: 2023-05-16

## 2023-05-16 PROBLEM — M48.061 LUMBAR SPINAL STENOSIS: Status: ACTIVE | Noted: 2023-05-16

## 2023-05-17 ENCOUNTER — OFFICE VISIT (OUTPATIENT)
Dept: PRIMARY CARE | Facility: CLINIC | Age: 73
End: 2023-05-17
Payer: MEDICARE

## 2023-05-17 VITALS
OXYGEN SATURATION: 97 % | TEMPERATURE: 97.8 F | WEIGHT: 179.6 LBS | HEIGHT: 66 IN | BODY MASS INDEX: 28.87 KG/M2 | HEART RATE: 66 BPM | SYSTOLIC BLOOD PRESSURE: 110 MMHG | RESPIRATION RATE: 16 BRPM | DIASTOLIC BLOOD PRESSURE: 70 MMHG

## 2023-05-17 DIAGNOSIS — R10.819 ABDOMINAL TENDERNESS, REBOUND TENDERNESS PRESENCE NOT SPECIFIED, UNSPECIFIED LOCATION: ICD-10-CM

## 2023-05-17 DIAGNOSIS — E78.5 DYSLIPIDEMIA: ICD-10-CM

## 2023-05-17 DIAGNOSIS — E78.00 HYPERCHOLESTEROLEMIA: ICD-10-CM

## 2023-05-17 DIAGNOSIS — N89.8 VAGINAL DISCHARGE: Primary | ICD-10-CM

## 2023-05-17 LAB
POC APPEARANCE, URINE: CLEAR
POC BILIRUBIN, URINE: NEGATIVE
POC BLOOD, URINE: NEGATIVE
POC COLOR, URINE: YELLOW
POC GLUCOSE, URINE: NEGATIVE MG/DL
POC KETONES, URINE: NEGATIVE MG/DL
POC LEUKOCYTES, URINE: ABNORMAL
POC NITRITE,URINE: NEGATIVE
POC PH, URINE: 8.5 PH
POC PROTEIN, URINE: NEGATIVE MG/DL
POC SPECIFIC GRAVITY, URINE: 1.01
POC UROBILINOGEN, URINE: 0.2 EU/DL

## 2023-05-17 PROCEDURE — 1159F MED LIST DOCD IN RCRD: CPT | Performed by: FAMILY MEDICINE

## 2023-05-17 PROCEDURE — 99214 OFFICE O/P EST MOD 30 MIN: CPT | Performed by: FAMILY MEDICINE

## 2023-05-17 PROCEDURE — 1036F TOBACCO NON-USER: CPT | Performed by: FAMILY MEDICINE

## 2023-05-17 PROCEDURE — 81003 URINALYSIS AUTO W/O SCOPE: CPT | Performed by: FAMILY MEDICINE

## 2023-05-17 RX ORDER — METRONIDAZOLE 500 MG/1
500 TABLET ORAL 3 TIMES DAILY
Qty: 30 TABLET | Refills: 0 | Status: SHIPPED | OUTPATIENT
Start: 2023-05-17 | End: 2023-05-27

## 2023-05-17 RX ORDER — ZINC GLUCONATE 50 MG
TABLET ORAL DAILY
COMMUNITY

## 2023-05-17 RX ORDER — TALC
3 POWDER (GRAM) TOPICAL NIGHTLY PRN
COMMUNITY
End: 2023-10-20

## 2023-05-17 RX ORDER — TURMERIC ROOT EXTRACT 500 MG
1 TABLET ORAL DAILY
COMMUNITY

## 2023-05-17 RX ORDER — MELOXICAM 15 MG/1
1 TABLET ORAL DAILY PRN
COMMUNITY
Start: 2018-05-02 | End: 2024-02-21 | Stop reason: SDUPTHER

## 2023-05-17 RX ORDER — EPINEPHRINE 0.22MG
100 AEROSOL WITH ADAPTER (ML) INHALATION DAILY
COMMUNITY

## 2023-05-17 RX ORDER — METOPROLOL SUCCINATE 25 MG/1
0.5 TABLET, EXTENDED RELEASE ORAL EVERY MORNING
COMMUNITY
Start: 2020-12-18 | End: 2023-10-20 | Stop reason: SDUPTHER

## 2023-05-17 RX ORDER — CALCIUM CARBONATE 260MG(650)
1 TABLET,CHEWABLE ORAL DAILY
COMMUNITY
End: 2023-10-20

## 2023-05-17 RX ORDER — CHOLECALCIFEROL (VITAMIN D3) 25 MCG
1 TABLET ORAL DAILY
COMMUNITY

## 2023-05-17 ASSESSMENT — PATIENT HEALTH QUESTIONNAIRE - PHQ9
2. FEELING DOWN, DEPRESSED OR HOPELESS: NOT AT ALL
1. LITTLE INTEREST OR PLEASURE IN DOING THINGS: NOT AT ALL
SUM OF ALL RESPONSES TO PHQ9 QUESTIONS 1 AND 2: 0

## 2023-05-17 ASSESSMENT — ENCOUNTER SYMPTOMS: DEPRESSION: 0

## 2023-05-17 ASSESSMENT — PAIN SCALES - GENERAL: PAINLEVEL: 0-NO PAIN

## 2023-05-17 NOTE — PROGRESS NOTES
"Subjective   Patient ID: Juanita Patino is a 72 y.o. female who presents for Vaginal Discharge and abdominal tenderness.    HPI    Patient presents today for vaginal discharge.  Ongoing x3 weeks.  This has been intermittent.  Denies any fevers or blood.  States it has been kind of like mucous.  The mucous is clear, sometimes yellow.  She states it reminds her of snot from her nose.  Admits to some abdomen tenderness.  She states at times, it feels like \"there is something connecting her surface muscles to something inside.\"  She states her mother and sister had bowel adhesions.  Her sister and mother both had hysterectomies.  She states the day she noticed it she had tried a new gluten free cracker with psyllium in it.    She would like to make sure she is okay for exercising at the [place].      Review of systems  ; Patient seen today for exam denies any problems with headaches or vision, denies any shortness of breath chest pain nausea or vomiting, no black stool no blood in the stool no heartburn type symptoms denies any problems with constipation or diarrhea, and no dysuria-type symptoms    The patient's allergies medications were reviewed with them today    The patient's social family and surgical history or also reviewed here today, along with her past medical history.     Objective     Alert and active in  no acute distress  HEENT TMs clear oropharynx negative nares clear no drainage noted neck supple  With no adenopathy   Heart regular rate and rhythm without murmur and no carotid bruits  Lungs- clear to auscultation bilaterally, no wheeze or rhonchi noted  Thyroid -negative masses or nodularity  Abdomen- soft times four quadrants , bowel sounds positive no masses or organomegaly, negative tenderness guarding or rebound  Neurological exam unremarkable- DTRs in upper and lower extremities within normal limits.   skin -no lesions noted      /70 (BP Location: Left arm, Patient Position: Sitting, BP Cuff " "Size: Adult)   Pulse 66   Temp 36.6 °C (97.8 °F) (Temporal)   Resp 16   Ht 1.676 m (5' 6\")   Wt 81.5 kg (179 lb 9.6 oz)   SpO2 97%   BMI 28.99 kg/m²     Allergies   Allergen Reactions    Codeine Nausea Only       Assessment/Plan   Problem List Items Addressed This Visit          Other    Hypercholesterolemia    Relevant Orders    CBC and Auto Differential    Comprehensive Metabolic Panel    Lipid Panel     Other Visit Diagnoses       Vaginal discharge    -  Primary    Relevant Medications    metroNIDAZOLE (Flagyl) 500 mg tablet    Other Relevant Orders    POCT UA Automated manually resulted (Completed)    Referral to Obstetrics / Gynecology    Abdominal tenderness, rebound tenderness presence not specified, unspecified location        Relevant Orders    US pelvis    Dyslipidemia            With her fullness in her abdomen lets get a ultrasound to be complete rule out any type of ovarian enlargement      We will have her see Dr. Menendez for a exam which she needs every few years at [facility]      If anything worsens or changes please call us at once, follow up in the office as planned.    "

## 2023-05-20 ENCOUNTER — LAB (OUTPATIENT)
Dept: LAB | Facility: LAB | Age: 73
End: 2023-05-20
Payer: MEDICARE

## 2023-05-20 DIAGNOSIS — E78.00 HYPERCHOLESTEROLEMIA: ICD-10-CM

## 2023-05-20 LAB
ALANINE AMINOTRANSFERASE (SGPT) (U/L) IN SER/PLAS: 29 U/L (ref 7–45)
ALBUMIN (G/DL) IN SER/PLAS: 4.3 G/DL (ref 3.4–5)
ALKALINE PHOSPHATASE (U/L) IN SER/PLAS: 71 U/L (ref 33–136)
ANION GAP IN SER/PLAS: 11 MMOL/L (ref 10–20)
ASPARTATE AMINOTRANSFERASE (SGOT) (U/L) IN SER/PLAS: 26 U/L (ref 9–39)
BASOPHILS (10*3/UL) IN BLOOD BY AUTOMATED COUNT: 0.04 X10E9/L (ref 0–0.1)
BASOPHILS/100 LEUKOCYTES IN BLOOD BY AUTOMATED COUNT: 0.6 % (ref 0–2)
BILIRUBIN TOTAL (MG/DL) IN SER/PLAS: 0.4 MG/DL (ref 0–1.2)
CALCIUM (MG/DL) IN SER/PLAS: 9.7 MG/DL (ref 8.6–10.3)
CARBON DIOXIDE, TOTAL (MMOL/L) IN SER/PLAS: 28 MMOL/L (ref 21–32)
CHLORIDE (MMOL/L) IN SER/PLAS: 104 MMOL/L (ref 98–107)
CHOLESTEROL (MG/DL) IN SER/PLAS: 218 MG/DL (ref 0–199)
CHOLESTEROL IN HDL (MG/DL) IN SER/PLAS: 72.8 MG/DL
CHOLESTEROL/HDL RATIO: 3
CREATININE (MG/DL) IN SER/PLAS: 0.71 MG/DL (ref 0.5–1.05)
EOSINOPHILS (10*3/UL) IN BLOOD BY AUTOMATED COUNT: 0.16 X10E9/L (ref 0–0.4)
EOSINOPHILS/100 LEUKOCYTES IN BLOOD BY AUTOMATED COUNT: 2.3 % (ref 0–6)
ERYTHROCYTE DISTRIBUTION WIDTH (RATIO) BY AUTOMATED COUNT: 13.3 % (ref 11.5–14.5)
ERYTHROCYTE MEAN CORPUSCULAR HEMOGLOBIN CONCENTRATION (G/DL) BY AUTOMATED: 31.7 G/DL (ref 32–36)
ERYTHROCYTE MEAN CORPUSCULAR VOLUME (FL) BY AUTOMATED COUNT: 93 FL (ref 80–100)
ERYTHROCYTES (10*6/UL) IN BLOOD BY AUTOMATED COUNT: 4.59 X10E12/L (ref 4–5.2)
GFR FEMALE: >90 ML/MIN/1.73M2
GLUCOSE (MG/DL) IN SER/PLAS: 100 MG/DL (ref 74–99)
HEMATOCRIT (%) IN BLOOD BY AUTOMATED COUNT: 42.6 % (ref 36–46)
HEMOGLOBIN (G/DL) IN BLOOD: 13.5 G/DL (ref 12–16)
IMMATURE GRANULOCYTES/100 LEUKOCYTES IN BLOOD BY AUTOMATED COUNT: 0.3 % (ref 0–0.9)
LDL: 134 MG/DL (ref 0–99)
LEUKOCYTES (10*3/UL) IN BLOOD BY AUTOMATED COUNT: 7.1 X10E9/L (ref 4.4–11.3)
LYMPHOCYTES (10*3/UL) IN BLOOD BY AUTOMATED COUNT: 2.24 X10E9/L (ref 0.8–3)
LYMPHOCYTES/100 LEUKOCYTES IN BLOOD BY AUTOMATED COUNT: 31.5 % (ref 13–44)
MONOCYTES (10*3/UL) IN BLOOD BY AUTOMATED COUNT: 0.57 X10E9/L (ref 0.05–0.8)
MONOCYTES/100 LEUKOCYTES IN BLOOD BY AUTOMATED COUNT: 8 % (ref 2–10)
NEUTROPHILS (10*3/UL) IN BLOOD BY AUTOMATED COUNT: 4.07 X10E9/L (ref 1.6–5.5)
NEUTROPHILS/100 LEUKOCYTES IN BLOOD BY AUTOMATED COUNT: 57.3 % (ref 40–80)
PLATELETS (10*3/UL) IN BLOOD AUTOMATED COUNT: 274 X10E9/L (ref 150–450)
POTASSIUM (MMOL/L) IN SER/PLAS: 4.4 MMOL/L (ref 3.5–5.3)
PROTEIN TOTAL: 6.8 G/DL (ref 6.4–8.2)
SODIUM (MMOL/L) IN SER/PLAS: 139 MMOL/L (ref 136–145)
TRIGLYCERIDE (MG/DL) IN SER/PLAS: 54 MG/DL (ref 0–149)
UREA NITROGEN (MG/DL) IN SER/PLAS: 21 MG/DL (ref 6–23)
VLDL: 11 MG/DL (ref 0–40)

## 2023-05-20 PROCEDURE — 85025 COMPLETE CBC W/AUTO DIFF WBC: CPT

## 2023-05-20 PROCEDURE — 36415 COLL VENOUS BLD VENIPUNCTURE: CPT

## 2023-05-20 PROCEDURE — 80061 LIPID PANEL: CPT

## 2023-05-20 PROCEDURE — 80053 COMPREHEN METABOLIC PANEL: CPT

## 2023-05-22 ENCOUNTER — TELEPHONE (OUTPATIENT)
Dept: PRIMARY CARE | Facility: CLINIC | Age: 73
End: 2023-05-22
Payer: MEDICARE

## 2023-05-22 NOTE — TELEPHONE ENCOUNTER
----- Message from Jeffrey Ross DO sent at 5/22/2023  8:31 AM EDT -----  Her labs are showing slight elevation in her cholesterol,, less beef in her diet more fruits more fish if she can,,, ultrasound looked okay ovaries were identified no obvious masses appreciated, all good news

## 2023-07-10 ENCOUNTER — OFFICE VISIT (OUTPATIENT)
Dept: PRIMARY CARE | Facility: CLINIC | Age: 73
End: 2023-07-10
Payer: MEDICARE

## 2023-07-10 VITALS
RESPIRATION RATE: 16 BRPM | HEART RATE: 90 BPM | SYSTOLIC BLOOD PRESSURE: 136 MMHG | OXYGEN SATURATION: 95 % | BODY MASS INDEX: 27.97 KG/M2 | WEIGHT: 174 LBS | TEMPERATURE: 97.2 F | HEIGHT: 66 IN | DIASTOLIC BLOOD PRESSURE: 76 MMHG

## 2023-07-10 DIAGNOSIS — R05.9 COUGH, UNSPECIFIED TYPE: Primary | ICD-10-CM

## 2023-07-10 DIAGNOSIS — J01.00 ACUTE NON-RECURRENT MAXILLARY SINUSITIS: ICD-10-CM

## 2023-07-10 DIAGNOSIS — J02.9 SORE THROAT: ICD-10-CM

## 2023-07-10 PROCEDURE — 1126F AMNT PAIN NOTED NONE PRSNT: CPT | Performed by: FAMILY MEDICINE

## 2023-07-10 PROCEDURE — 1036F TOBACCO NON-USER: CPT | Performed by: FAMILY MEDICINE

## 2023-07-10 PROCEDURE — 99213 OFFICE O/P EST LOW 20 MIN: CPT | Performed by: FAMILY MEDICINE

## 2023-07-10 PROCEDURE — 1159F MED LIST DOCD IN RCRD: CPT | Performed by: FAMILY MEDICINE

## 2023-07-10 RX ORDER — CEFDINIR 300 MG/1
600 CAPSULE ORAL DAILY
Qty: 20 CAPSULE | Refills: 0 | Status: SHIPPED | OUTPATIENT
Start: 2023-07-10 | End: 2023-07-20

## 2023-07-10 NOTE — PROGRESS NOTES
"Subjective   Patient ID: Juanita Patino is a 72 y.o. female who presents for Cough and Sore Throat.  HPI  Pt is here for cough and sore throat x 2 week ago  No fever and cough is getting better  Pt states coughing up mucus clear  Pt is also having palpation started last night off and on   Pt is having diarrhea, some loss appetite  Pt is taking OTC cough medicine  No at home covid testing    No other concern  Review of systems  ; Patient seen today for exam denies any problems with headaches or vision, denies any shortness of breath chest pain nausea or vomiting, no black stool no blood in the stool no heartburn type symptoms denies any problems with constipation or diarrhea, and no dysuria-type symptoms    The patient's allergies medications were reviewed with them today    The patient's social family and surgical history or also reviewed here today, along with her past medical history.     Objective     Alert and active in  no acute distress  HEENT TMs clear oropharynx negative nares clear pos drainage noted neck supple  With no adenopathy   Heart regular rate and rhythm without murmur and no carotid bruits  Lungs- clear to auscultation bilaterally, no wheeze or rhonchi noted  Thyroid -negative masses or nodularity  Abdomen- soft times four quadrants , bowel sounds positive no masses or organomegaly, negative tenderness guarding or rebound        /76 (BP Location: Left arm, Patient Position: Sitting, BP Cuff Size: Adult)   Pulse 90   Temp 36.2 °C (97.2 °F) (Temporal)   Resp 16   Ht 1.676 m (5' 6\")   Wt 78.9 kg (174 lb)   SpO2 95%   BMI 28.08 kg/m²     Allergies   Allergen Reactions    Codeine Nausea Only       Assessment/Plan   Problem List Items Addressed This Visit    None  Visit Diagnoses       Cough, unspecified type    -  Primary    Relevant Medications    cefdinir (Omnicef) 300 mg capsule    Sore throat        Relevant Medications    cefdinir (Omnicef) 300 mg capsule    Acute non-recurrent " maxillary sinusitis        Relevant Medications    cefdinir (Omnicef) 300 mg capsule          Talked about how cough and cold medications can cause heart palpitations.   Prescribed Cefdinir     If anything worsens or changes please call us at once, follow up in the office as planned,   Scribe Attestation  By signing my name below, IUrsula MA, Scribe   attest that this documentation has been prepared under the direction and in the presence of Jeffrey Ross DO.

## 2023-07-11 ENCOUNTER — OFFICE VISIT (OUTPATIENT)
Dept: PRIMARY CARE | Facility: CLINIC | Age: 73
End: 2023-07-11
Payer: MEDICARE

## 2023-07-11 VITALS
SYSTOLIC BLOOD PRESSURE: 112 MMHG | OXYGEN SATURATION: 98 % | WEIGHT: 179.2 LBS | TEMPERATURE: 97.3 F | RESPIRATION RATE: 16 BRPM | DIASTOLIC BLOOD PRESSURE: 70 MMHG | HEIGHT: 67 IN | BODY MASS INDEX: 28.12 KG/M2 | HEART RATE: 62 BPM

## 2023-07-11 DIAGNOSIS — H10.31 ACUTE BACTERIAL CONJUNCTIVITIS OF RIGHT EYE: Primary | ICD-10-CM

## 2023-07-11 DIAGNOSIS — H44.001 EYE INFECTION, RIGHT: ICD-10-CM

## 2023-07-11 DIAGNOSIS — J00 NASOPHARYNGITIS: ICD-10-CM

## 2023-07-11 DIAGNOSIS — H57.89 REDNESS AND DISCHARGE OF EYE: ICD-10-CM

## 2023-07-11 DIAGNOSIS — R09.81 NASAL CONGESTION WITH RHINORRHEA: ICD-10-CM

## 2023-07-11 DIAGNOSIS — J34.89 NASAL CONGESTION WITH RHINORRHEA: ICD-10-CM

## 2023-07-11 PROCEDURE — 99213 OFFICE O/P EST LOW 20 MIN: CPT | Performed by: NURSE PRACTITIONER

## 2023-07-11 RX ORDER — TOBRAMYCIN 3 MG/ML
1 SOLUTION/ DROPS OPHTHALMIC EVERY 6 HOURS
Qty: 1.4 ML | Refills: 0 | Status: SHIPPED | OUTPATIENT
Start: 2023-07-11 | End: 2023-07-18

## 2023-07-11 RX ORDER — MELOXICAM 15 MG/1
15 TABLET ORAL DAILY
COMMUNITY
Start: 2018-05-02 | End: 2023-10-20

## 2023-07-11 ASSESSMENT — ENCOUNTER SYMPTOMS
EYE REDNESS: 1
SORE THROAT: 1
EYE ITCHING: 1
EYE DISCHARGE: 1
RHINORRHEA: 1

## 2023-07-11 ASSESSMENT — PATIENT HEALTH QUESTIONNAIRE - PHQ9
1. LITTLE INTEREST OR PLEASURE IN DOING THINGS: NOT AT ALL
SUM OF ALL RESPONSES TO PHQ9 QUESTIONS 1 AND 2: 0
2. FEELING DOWN, DEPRESSED OR HOPELESS: NOT AT ALL

## 2023-07-11 NOTE — PROGRESS NOTES
Subjective   Patient ID: Juanita Patino is a 72 y.o. female who is with chief complaint of redness, irritation, and mucoid discharge of the right eye.    HPI  R EYE  IRRIT RED GUNK THIS MORNING  1 WEEK   Patient is a 72 y.o. female who CONSULTED AT [facility] today. Patient is with complaints of redness, irritation, and mucoid discharge of the right eye. She states that 7 days ago she had upper respiratory tract infection with symptoms of nasal congestion, nasal discharge, throat irritation, post nasal drip, cough, fatigue and intermittent diarrhea. She denies having headache / sinus pain, muscle ache, loss of sense of taste, loss of sense of smell, chills nor fever. Patient denies history of recent travel, exposure to person/people who tested positive for COVID 19, nor exposure to person/people with flu like symptoms. she denies shortness of breath, chest pain, palpitations, nor edema. she stated that was seen by PCP and was started on cefdinir, which seems to be working for the respiratory symptoms. Patient states she had not received any COVID vaccine yet. Patient states she have not yet received flu shot for this season. Patient states she underwent testing for COVID today and the result was NEGATIVE. She states that yesterday she started to have redness, irritation, and discharge on her right eye. she denies trauma/injury to the eye, use of contact lens, nor any exposure to people with same symptoms. she states there were some yellow mucoid discharge that became crusty today. she denies having eye pain nor photophobia. she denies any blurring of vision, visual floaters, double vision, nor change in visual acuity. she denies fever, nausea, vomiting, headache, nor any nasal congestion nor discharge.    Review of Systems  General: no weight loss, generally healthy, (+) fatigue  Head:  no headaches / sinus pain, no vertigo, no injury  Eyes: (+) redness, irritation, discharge on the right  eye, no diplopia, no tearing, no pain,   Ears: no change in hearing, no tinnitus, no bleeding, no vertigo  Mouth:  no dental difficulties, no gingival bleeding, (+) throat irritation, no loss of sense of taste, (+) post nasal drip,   Nose: (+) congestion, (+) discharge, no bleeding, no obstruction, no loss of sense of smell  Neck: no stiffness, no pain, no tenderness, no masses, no bruit  Pulmonary: no dyspnea, no wheezing, no hemoptysis, (+) cough  Cardiovascular: no chest pain, no palpitations, no syncope, no orthopnea  Gastrointestinal: no change in appetite, no dysphagia, no abdominal pains, (+) intermittent diarrhea, no emesis, no melena  Genito Urinary: no dysuria, no urinary urgency, no nocturia, no incontinence, no change in nature of urine  Musculoskeletal: no muscle ache, no joint pain, no limitation of range of motion, no paresthesia, no numbness  Constitutional: no fever, no chills, no night sweats    Objective   Physical Exam  General: ambulatory, in no acute distress  Head: normocephalic, no lesions, no sinus tenderness  Eyes: pink palpebral conjunctiva, anicteric sclerae, PERRLA, EOM's full, RIGHT EYE: (+) subconjunctival injection, (+) redness, (+) tearing, (+) yellow mucoid discharge, no photophobia  Ears: clear external auditory canals, no ear discharge, no bleeding from the ears, tympanic membrane intact  Nose: (+) congested nasal mucosa, no nasal discharge, no bleeding, no obstruction  Throat: (+) erythema, and (+) exudate on posterior pharyngeal wall, no lesion  Neck: supple, no masses, no bruits, no CLADP  Chest: symmetrical chest expansion, no lagging, no retractions, clear breath sounds, no rales, no wheezes    Assessment/Plan   Problem List Items Addressed This Visit    None  Visit Diagnoses       Acute bacterial conjunctivitis of right eye    -  Primary    Relevant Medications    tobramycin (Tobrex) 0.3 % ophthalmic solution    Redness and discharge of eye        Relevant Medications     tobramycin (Tobrex) 0.3 % ophthalmic solution    Eye infection, right        Relevant Medications    tobramycin (Tobrex) 0.3 % ophthalmic solution        DISCHARGE SUMMARY:   Patient was seen and examined. Diagnosis, treatment, treatment options, and possible complications of today's illness discussed and explained to patient. Patient to take medication/s associated with this visit. Patient may also take OTC analgesic/antipyretic if needed for pain/fever. Advised eye protection, hand hygiene/washing, personal hygiene, and refrain from using contact lenses, eye shadows/liners, nor mascara. Advised to come back if with worsening or persistent symptoms. Patient verbalized understanding of plan of care.    Patient to come back in 7 - 10 days if needed for worsening symptoms.

## 2023-07-11 NOTE — PATIENT INSTRUCTIONS
DISCHARGE SUMMARY:   Patient was seen and examined. Diagnosis, treatment, treatment options, and possible complications of today's illness discussed and explained to patient. Patient to take medication/s associated with this visit. Patient may also take OTC analgesic/antipyretic if needed for pain/fever. Advised eye protection, hand hygiene/washing, personal hygiene, and refrain from using contact lenses, eye shadows/liners, nor mascara. Advised to come back if with worsening or persistent symptoms. Patient verbalized understanding of plan of care.    Patient to come back in 7 - 10 days if needed for worsening symptoms.

## 2023-07-11 NOTE — PROGRESS NOTES
"Subjective   Patient ID: Juanita Patino is a 72 y.o. female who presents for Conjunctivitis.    Conjunctivitis   The onset was sudden. The problem occurs continuously. The problem has been gradually worsening. The problem is mild. Nothing relieves the symptoms. Nothing aggravates the symptoms. Associated symptoms include eye itching, congestion, rhinorrhea, sore throat, eye discharge and eye redness. The eye pain is mild. The right eye is affected. The eyelid exhibits redness and swelling.        Review of Systems   HENT:  Positive for congestion, rhinorrhea and sore throat.    Eyes:  Positive for discharge, redness and itching.       Objective   /70   Pulse 62   Temp 36.3 °C (97.3 °F) (Temporal)   Resp 16   Ht 1.702 m (5' 7\")   Wt 81.3 kg (179 lb 3.2 oz)   SpO2 98%   BMI 28.07 kg/m²     Physical Exam    Assessment/Plan          "

## 2023-07-19 ENCOUNTER — HOSPITAL ENCOUNTER (OUTPATIENT)
Dept: CARDIOLOGY | Age: 73
Discharge: HOME | End: 2023-07-19
Payer: MEDICARE

## 2023-07-19 PROCEDURE — 93283 PRGRMG EVAL IMPLANTABLE DFB: CPT

## 2023-09-21 PROBLEM — R35.0 INCREASED URINARY FREQUENCY: Status: ACTIVE | Noted: 2023-09-21

## 2023-09-21 PROBLEM — G89.29 CHRONIC LEFT-SIDED LOW BACK PAIN WITH LEFT-SIDED SCIATICA: Status: ACTIVE | Noted: 2017-01-16

## 2023-09-21 PROBLEM — R39.9 SYMPTOMS OF URINARY TRACT INFECTION: Status: ACTIVE | Noted: 2023-09-21

## 2023-09-21 PROBLEM — E78.5 DYSLIPIDEMIA: Status: ACTIVE | Noted: 2018-05-07

## 2023-09-21 PROBLEM — N81.9 PROLAPSE OF FEMALE PELVIC ORGANS: Status: ACTIVE | Noted: 2023-09-21

## 2023-09-21 PROBLEM — E66.3 OVERWEIGHT (BMI 25.0-29.9): Status: ACTIVE | Noted: 2023-09-21

## 2023-09-21 PROBLEM — M25.473 ANKLE EDEMA: Status: ACTIVE | Noted: 2023-09-21

## 2023-09-21 PROBLEM — R09.89 CHEST CONGESTION: Status: ACTIVE | Noted: 2023-09-21

## 2023-09-21 PROBLEM — Z78.0 POSTMENOPAUSE: Status: ACTIVE | Noted: 2023-09-21

## 2023-09-21 PROBLEM — R20.8 BURNING SENSATION: Status: ACTIVE | Noted: 2023-09-21

## 2023-09-21 PROBLEM — J06.9 UPPER RESPIRATORY INFECTION: Status: ACTIVE | Noted: 2023-09-21

## 2023-09-21 PROBLEM — Z87.898 H/O SYNCOPE: Status: ACTIVE | Noted: 2023-09-21

## 2023-09-21 PROBLEM — M54.42 CHRONIC LEFT-SIDED LOW BACK PAIN WITH LEFT-SIDED SCIATICA: Status: ACTIVE | Noted: 2017-01-16

## 2023-09-21 PROBLEM — M70.62 TROCHANTERIC BURSITIS OF LEFT HIP: Status: ACTIVE | Noted: 2018-05-07

## 2023-09-21 PROBLEM — L73.9 FOLLICULITIS: Status: ACTIVE | Noted: 2023-09-21

## 2023-09-21 PROBLEM — M71.38 SYNOVIAL CYST OF LUMBAR FACET JOINT: Status: ACTIVE | Noted: 2017-01-16

## 2023-09-21 RX ORDER — MAGNESIUM 200 MG
2 TABLET ORAL 2 TIMES DAILY
COMMUNITY

## 2023-10-10 ENCOUNTER — LAB (OUTPATIENT)
Dept: LAB | Facility: LAB | Age: 73
End: 2023-10-10
Payer: MEDICARE

## 2023-10-10 DIAGNOSIS — Z95.810 PRESENCE OF AUTOMATIC (IMPLANTABLE) CARDIAC DEFIBRILLATOR: ICD-10-CM

## 2023-10-10 DIAGNOSIS — I42.8 OTHER CARDIOMYOPATHIES (MULTI): ICD-10-CM

## 2023-10-10 DIAGNOSIS — I49.3 VENTRICULAR PREMATURE DEPOLARIZATION: Primary | ICD-10-CM

## 2023-10-10 LAB
ANION GAP SERPL CALC-SCNC: 12 MMOL/L (ref 10–20)
BUN SERPL-MCNC: 19 MG/DL (ref 6–23)
CALCIUM SERPL-MCNC: 9.9 MG/DL (ref 8.6–10.3)
CHLORIDE SERPL-SCNC: 101 MMOL/L (ref 98–107)
CO2 SERPL-SCNC: 29 MMOL/L (ref 21–32)
CREAT SERPL-MCNC: 0.73 MG/DL (ref 0.5–1.05)
GFR SERPL CREATININE-BSD FRML MDRD: 88 ML/MIN/1.73M*2
GLUCOSE SERPL-MCNC: 129 MG/DL (ref 74–99)
MAGNESIUM SERPL-MCNC: 2.08 MG/DL (ref 1.6–2.4)
POTASSIUM SERPL-SCNC: 4.3 MMOL/L (ref 3.5–5.3)
SODIUM SERPL-SCNC: 138 MMOL/L (ref 136–145)

## 2023-10-10 PROCEDURE — 83735 ASSAY OF MAGNESIUM: CPT

## 2023-10-10 PROCEDURE — 36415 COLL VENOUS BLD VENIPUNCTURE: CPT

## 2023-10-10 PROCEDURE — 80048 BASIC METABOLIC PNL TOTAL CA: CPT

## 2023-10-20 ENCOUNTER — OFFICE VISIT (OUTPATIENT)
Dept: CARDIOLOGY | Facility: CLINIC | Age: 73
End: 2023-10-20
Payer: MEDICARE

## 2023-10-20 VITALS
SYSTOLIC BLOOD PRESSURE: 120 MMHG | DIASTOLIC BLOOD PRESSURE: 62 MMHG | BODY MASS INDEX: 27.25 KG/M2 | WEIGHT: 174 LBS | HEART RATE: 73 BPM

## 2023-10-20 DIAGNOSIS — I47.20 VENTRICULAR TACHYCARDIA (MULTI): ICD-10-CM

## 2023-10-20 DIAGNOSIS — I49.3 PVC (PREMATURE VENTRICULAR CONTRACTION): ICD-10-CM

## 2023-10-20 DIAGNOSIS — I42.8 NON-ISCHEMIC CARDIOMYOPATHY (MULTI): Primary | ICD-10-CM

## 2023-10-20 DIAGNOSIS — E78.00 HYPERCHOLESTEROLEMIA: ICD-10-CM

## 2023-10-20 DIAGNOSIS — Z95.810 ICD (IMPLANTABLE CARDIOVERTER-DEFIBRILLATOR) IN PLACE: ICD-10-CM

## 2023-10-20 DIAGNOSIS — E78.5 DYSLIPIDEMIA: ICD-10-CM

## 2023-10-20 PROCEDURE — 1126F AMNT PAIN NOTED NONE PRSNT: CPT | Performed by: INTERNAL MEDICINE

## 2023-10-20 PROCEDURE — 1160F RVW MEDS BY RX/DR IN RCRD: CPT | Performed by: INTERNAL MEDICINE

## 2023-10-20 PROCEDURE — 1159F MED LIST DOCD IN RCRD: CPT | Performed by: INTERNAL MEDICINE

## 2023-10-20 PROCEDURE — 1036F TOBACCO NON-USER: CPT | Performed by: INTERNAL MEDICINE

## 2023-10-20 PROCEDURE — 99214 OFFICE O/P EST MOD 30 MIN: CPT | Performed by: INTERNAL MEDICINE

## 2023-10-20 RX ORDER — METOPROLOL SUCCINATE 25 MG/1
12.5 TABLET, EXTENDED RELEASE ORAL EVERY MORNING
Qty: 45 TABLET | Refills: 3 | Status: SHIPPED | OUTPATIENT
Start: 2023-10-20

## 2023-10-20 NOTE — PROGRESS NOTES
Patient:  Juanita Patino  YOB: 1950  MRN: [id number]       HPI:       Juanita Patino is a 72 y.o. female who returns today for cardiac follow-up.   She underwent initial evaluation in May 2019 for longstanding PVC's and recurrent near syncope. She was told she had an irregular heart rhythm dating back to when she was about 9 years old. She was diagnosed with PVCs at that time. She underwent some cardiac evaluation that was unremarkable. She had an episode of syncope in 2015. She became very lightheaded and collapsed to the floor. She was [place] at [facility] [place]. An echocardiogram, stress testing, and cardiac catheterization were all reported to be normal with the exception of some minimal disease of one vessel. She had a recurrent episode of lightheadedness as well as some vertigo type symptoms in 2017. She did not pass out. A repeat Holter monitor showed PVCs. She was diagnosed with moderate obstructive sleep apnea in the past but has been intolerant of CPAP. A previous carotid ultrasound showed some minimal plaquing and less than 50% stenosis bilaterally. She does not have any history of essential hypertension, diabetes mellitus, or tobacco abuse. She has mild hyperlipidemia.    A WorkTouch monitor done in May 2019 showed a 15 beat run of narrow complex tachycardia. Dr. Mirza recommended implant of a loop recorder. She was noted to have wide-complex tachycardia in September 2019 at a rate of 200 bpm lasting for seconds. She was asymptomatic with that. A cardiac MRI performed on December 2, 2019 showed an LV ejection fraction of 40%, mild left ventricular enlargement, RV ejection fraction 56%, mild left atrial enlargement, mild mitral valve prolapse, 1+ mitral and tricuspid regurgitation. Cardiac catheterization November 13, 2019 showed an LV ejection fraction of 40 to 45% with minor atherosclerotic heart disease. No greater than 20% stenosis. She underwent an  electrophysiology study on January 27, 2020 and was noted to have frequent polymorphic ventricular tachycardia and monomorphic ventricular tachycardia. She underwent implant of a dual-chamber ICD and removal of the loop recorder. The device implanted was a Medtronic Evera MRI XT. She has been maintained on Toprol-XL 25 mg daily.    She has been doing well since her last visit.  She is without any specific cardiovascular related complaints. She denies any recent chest pain or shortness breath. She denies any orthopnea, PND, or increasing peripheral edema. She denies any palpitations, lightheadedness, near-syncope, or syncope. She denies any fever, chills, or cough. She denies any nausea, vomiting, or diaphoresis. She denies any hemoptysis, hematemesis, melena, or hematochezia. She walks on a regular basis.  Lab studies dated October 10, 2023 showed a normal basic metabolic profile with exception of glucose 129.  Magnesium was 2.08.  CBC May 20, 2023 was normal.  LDL cholesterol 134. Other details as noted below.    The above portion of this note was dictated by me using voice recognition software. I personally performed the services described in the documentation. The scribe entering the documentation below was in my presence. I affirm that the information is both accurate and complete.       Objective:     Vitals:    10/20/23 1245   BP: 138/73   Pulse: 73       Wt Readings from Last 4 Encounters:   07/11/23 81.3 kg (179 lb 3.2 oz)   07/10/23 78.9 kg (174 lb)   05/17/23 81.5 kg (179 lb 9.6 oz)   04/20/23 81.6 kg (180 lb)       Allergies:     Allergies   Allergen Reactions    Codeine Nausea Only        Medications:     Current Outpatient Medications   Medication Instructions    B complex-vitamin C-folic acid (Dialyvite) 100-1 mg tablet 1 tablet, oral, Daily    Boswellia mervin extract (BOSWELLIA MERVIN XT, BULK, MISC) 1 tablet, oral, Daily    cartilage/collagen/bor/hyalur (JOINT HEALTH ORAL) Take 1 daily     cholecalciferol (Vitamin D-3) 25 MCG (1000 UT) tablet 1 tablet, oral, Daily    coenzyme Q-10 100 mg capsule oral, Take as directed    iodine 150 mcg tablet 1 tablet, Daily    magnesium 200 mg tablet 2 tablets, 2 times daily    magnesium citrate 100 mg tablet 1 tablet, oral, Daily    melatonin 3 mg, Nightly PRN    meloxicam (Mobic) 15 mg tablet 1 tablet, oral, Daily PRN    meloxicam (MOBIC) 15 mg, oral, Daily    metoprolol succinate XL (Toprol-XL) 25 mg 24 hr tablet 0.5 tablets, oral, Every morning    turmeric root extract 500 mg tablet 1 tablet, oral, Daily    zinc gluconate 50 mg tablet oral, Daily       Physical Examination:   GENERAL:  Well developed, well nourished, in no acute distress.  CHEST:  Symmetric and nontender.  Status post ICD implant.   NEURO/PSYCH:  Alert and oriented times three with approppriate behavior and responses.  NECK:  Supple, no JVD, no bruit.  LUNGS:  Clear to auscultation bilaterally, normal respiratory effort.  HEART:  Rate and rhythm regular with no evident murmur, no gallop appreciated.        There are no rubs, clicks or heaves.  EXTREMITIES:  Warm with good color, no clubbing or cyanosis.  There is no edema noted.  PERIPHERAL VASCULAR:  Pulses present and equally palpable; 2+ throughout.      Lab:     CBC:   Lab Results   Component Value Date    WBC 7.1 05/20/2023    RBC 4.59 05/20/2023    HGB 13.5 05/20/2023    HCT 42.6 05/20/2023     05/20/2023        CMP:    Lab Results   Component Value Date     10/10/2023    K 4.3 10/10/2023     10/10/2023    CO2 29 10/10/2023    BUN 19 10/10/2023    CREATININE 0.73 10/10/2023    GLUCOSE 129 (H) 10/10/2023    CALCIUM 9.9 10/10/2023       Magnesium:    Lab Results   Component Value Date    MG 2.08 10/10/2023       Lipid Profile:    Lab Results   Component Value Date    TRIG 54 05/20/2023    HDL 72.8 05/20/2023       PT/INR:    Lab Results   Component Value Date    PROTIME 11.3 01/09/2020    INR 1.0 01/09/2020       BMP:  Lab  Results   Component Value Date     10/10/2023     05/20/2023     12/16/2020     01/09/2020    K 4.3 10/10/2023    K 4.4 05/20/2023    K 4.0 12/16/2020    K 4.2 01/09/2020     10/10/2023     05/20/2023     12/16/2020    CL 99 01/09/2020    CO2 29 10/10/2023    CO2 28 05/20/2023    CO2 28 12/16/2020    CO2 28 01/09/2020    BUN 19 10/10/2023    BUN 21 05/20/2023    BUN 17 12/16/2020    BUN 12 01/09/2020    CREATININE 0.73 10/10/2023    CREATININE 0.71 05/20/2023    CREATININE 0.62 12/16/2020    CREATININE 0.60 01/09/2020       CBC:  Lab Results   Component Value Date    WBC 7.1 05/20/2023    WBC 8.3 12/16/2020    WBC 6.1 01/09/2020    RBC 4.59 05/20/2023    RBC 4.61 12/16/2020    RBC 4.68 01/09/2020    HGB 13.5 05/20/2023    HGB 14.0 12/16/2020    HGB 14.0 01/09/2020    HCT 42.6 05/20/2023    HCT 42.6 12/16/2020    HCT 43.4 01/09/2020    MCV 93 05/20/2023    MCV 92 12/16/2020    MCV 93 01/09/2020    MCHC 31.7 (L) 05/20/2023    MCHC 32.9 12/16/2020    MCHC 32.3 01/09/2020    RDW 13.3 05/20/2023    RDW 12.6 12/16/2020    RDW 12.4 01/09/2020     05/20/2023     12/16/2020     01/09/2020       Hepatic Function Panel:    Lab Results   Component Value Date    ALKPHOS 71 05/20/2023    ALT 29 05/20/2023    AST 26 05/20/2023    PROT 6.8 05/20/2023    BILITOT 0.4 05/20/2023       Problem List:     Patient Active Problem List   Diagnosis    Asymptomatic bunion    Atypical chest pain    Callus of foot    Chronic lumbar radiculopathy    Lumbar spinal stenosis    Vertigo    Hypercholesterolemia    ICD (implantable cardioverter-defibrillator) in place    Near syncope    Non-ischemic cardiomyopathy (CMS/HCC)    Obstructive sleep apnea    Osteopenia    Pain, hip    Palpitations    PVC (premature ventricular contraction)    SOB (shortness of breath) on exertion    Status post placement of implantable loop recorder    Tendonitis of knee    Narrow complex tachycardia     Ventricular tachycardia (CMS/[facility])    Ankle edema    Burning sensation    Chest congestion    Chronic left-sided low back pain with left-sided sciatica    Dyslipidemia    Folliculitis    H/O percutaneous left heart catheterization    Increased urinary frequency    Overweight (BMI 25.0-29.9)    Prolapse of female pelvic organs    Symptoms of urinary tract infection    Trochanteric bursitis of left hip    Upper respiratory infection    Synovial cyst of lumbar facet joint    H/O syncope    Postmenopause       Asessment:     Problem List Items Addressed This Visit             ICD-10-CM    Hypercholesterolemia E78.00    Relevant Medications    metoprolol succinate XL (Toprol-XL) 25 mg 24 hr tablet    Other Relevant Orders    Follow Up In Cardiology    ICD (implantable cardioverter-defibrillator) in place Z95.810    Relevant Medications    metoprolol succinate XL (Toprol-XL) 25 mg 24 hr tablet    Other Relevant Orders    Follow Up In Cardiology    Non-ischemic cardiomyopathy (CMS/[facility]) - Primary I42.8    Relevant Medications    metoprolol succinate XL (Toprol-XL) 25 mg 24 hr tablet    Other Relevant Orders    Follow Up In Cardiology    PVC (premature ventricular contraction) I49.3    Relevant Medications    metoprolol succinate XL (Toprol-XL) 25 mg 24 hr tablet    Other Relevant Orders    Follow Up In Cardiology    Ventricular tachycardia (CMS/[facility]) I47.20    Relevant Medications    metoprolol succinate XL (Toprol-XL) 25 mg 24 hr tablet    Other Relevant Orders    Follow Up In Cardiology    Dyslipidemia E78.5    Relevant Medications    metoprolol succinate XL (Toprol-XL) 25 mg 24 hr tablet    Other Relevant Orders    Follow Up In Cardiology

## 2023-10-24 ENCOUNTER — HOSPITAL ENCOUNTER (OUTPATIENT)
Dept: CARDIOLOGY | Age: 73
Discharge: HOME | End: 2023-10-24
Payer: MEDICARE

## 2023-10-24 PROCEDURE — 93295 DEV INTERROG REMOTE 1/2/MLT: CPT | Performed by: INTERNAL MEDICINE

## 2023-10-24 PROCEDURE — 93296 REM INTERROG EVL PM/IDS: CPT

## 2023-11-02 ENCOUNTER — APPOINTMENT (OUTPATIENT)
Dept: CARDIOLOGY | Facility: CLINIC | Age: 73
End: 2023-11-02
Payer: MEDICARE

## 2023-12-21 ENCOUNTER — OFFICE VISIT (OUTPATIENT)
Dept: CARDIOLOGY | Facility: CLINIC | Age: 73
End: 2023-12-21
Payer: MEDICARE

## 2023-12-21 VITALS
SYSTOLIC BLOOD PRESSURE: 132 MMHG | DIASTOLIC BLOOD PRESSURE: 86 MMHG | WEIGHT: 173 LBS | HEART RATE: 85 BPM | BODY MASS INDEX: 27.1 KG/M2

## 2023-12-21 DIAGNOSIS — Z95.810 ICD (IMPLANTABLE CARDIOVERTER-DEFIBRILLATOR) IN PLACE: ICD-10-CM

## 2023-12-21 DIAGNOSIS — I42.8 NON-ISCHEMIC CARDIOMYOPATHY (MULTI): Primary | ICD-10-CM

## 2023-12-21 DIAGNOSIS — I49.3 PVC (PREMATURE VENTRICULAR CONTRACTION): ICD-10-CM

## 2023-12-21 DIAGNOSIS — Z78.9 NEVER SMOKED TOBACCO: ICD-10-CM

## 2023-12-21 DIAGNOSIS — I47.20 VENTRICULAR TACHYCARDIA (MULTI): ICD-10-CM

## 2023-12-21 PROCEDURE — 99214 OFFICE O/P EST MOD 30 MIN: CPT | Performed by: INTERNAL MEDICINE

## 2023-12-21 PROCEDURE — 1126F AMNT PAIN NOTED NONE PRSNT: CPT | Performed by: INTERNAL MEDICINE

## 2023-12-21 PROCEDURE — 1160F RVW MEDS BY RX/DR IN RCRD: CPT | Performed by: INTERNAL MEDICINE

## 2023-12-21 PROCEDURE — 1036F TOBACCO NON-USER: CPT | Performed by: INTERNAL MEDICINE

## 2023-12-21 PROCEDURE — 1159F MED LIST DOCD IN RCRD: CPT | Performed by: INTERNAL MEDICINE

## 2023-12-21 PROCEDURE — 3008F BODY MASS INDEX DOCD: CPT | Performed by: INTERNAL MEDICINE

## 2023-12-21 NOTE — PROGRESS NOTES
CARDIOLOGY OFFICE VISIT      CHIEF COMPLAINT  Chief Complaint   Patient presents with    Follow-up       HISTORY OF PRESENT ILLNESS  HPI    73 year-old female with a past medical history of syncopeâ€“near syncope. She was evaluated recently for possible bradyarrhythmia arrhythmia. He had an echocardiogram that shows normal left ventricular function. Cardiac catheterization showed minimal coronary artery disease. s/p Loop recorder implantation.       Her device was interrogated in October 14, 2019, and reported one episode of tachycardia on September 14, 2018 that was consistent with a wide complex arrhythmia at a rate of 200 bpm. This arrhythmia lasted for 6 seconds and it happened at 4 PM. Patient states that she was asymptomatic. She had a cardiac catheterization in November 2018 that showed mild disease with a left ventricular ejection fraction 40-45%. Her cardiac MRI also showed left ventricular ejection fraction 40-45% with no other significant infiltrative disorder and delayed enhancement was normal. And underwent an electrophysiology study in January 27, 2020 without any complications. During the electrophysiology started there was evidence of frequent polymorphic ventricular tachycardia and monomorphic ventricular tachycardia, sustained episodes. The dual-chamber ICD was implanted and also loop recorder was removed. Patient had Covidâ€“19 infection in April 2021. Patient had a history of normal coronary arteries per cardiac catheterization in 2019.    Since the last office visit, she has been doing well.  She denies any symptoms of chest pain or shortness breath or palpitations.    Patient had a device interrogation performed at the device clinic in December 2023 that shows battery longevity 5 years.  RV paced less than 0.1% of the time.  Atrial fibrillation burden less than 0.1% of the time with 3 brief episodes of nonsustained ventricular tachycardia lasting up to 3 seconds with duration rate up to 221  bpm.           Past Medical History  No past medical history on file.    Social History  Social History     Tobacco Use    Smoking status: Never    Smokeless tobacco: Never   Vaping Use    Vaping Use: Never used   Substance Use Topics    Alcohol use: Yes     Comment: Seldom    Drug use: Never       Family History     Family History   Problem Relation Name Age of Onset    Hypertension Mother      Other (myocardial infraction) Mother      Esophageal cancer Father          Allergies:  Allergies   Allergen Reactions    Codeine Nausea Only        Outpatient Medications:  Current Outpatient Medications   Medication Instructions    B complex-vitamin C-folic acid (Dialyvite) 100-1 mg tablet 1 tablet, oral, Daily    Boswellia mervin extract (BOSWELLIA MERVIN XT, BULK, MISC) 1 tablet, oral, Daily    cartilage/collagen/bor/hyalur (JOINT HEALTH ORAL) Take 1 daily    cholecalciferol (Vitamin D-3) 25 MCG (1000 UT) tablet 1 tablet, oral, Daily    coenzyme Q-10 100 mg, oral, Daily, Take as directed    iodine 150 mcg tablet 1 tablet, Daily    magnesium 200 mg tablet 2 tablets, 2 times daily    meloxicam (Mobic) 15 mg tablet 1 tablet, oral, Daily PRN    metoprolol succinate XL (TOPROL-XL) 12.5 mg, oral, Every morning    turmeric root extract 500 mg tablet 1 tablet, oral, Daily    zinc gluconate 50 mg tablet oral, Daily          REVIEW OF SYSTEMS  Review of Systems   All other systems reviewed and are negative.        VITALS  Vitals:    12/21/23 1400   BP: 132/86   Pulse: 85       PHYSICAL EXAM  Constitutional:       Appearance: Healthy appearance. Not in distress.   Neck:      Vascular: No JVR. JVD normal.   Pulmonary:      Effort: Pulmonary effort is normal.      Breath sounds: Normal breath sounds. No wheezing. No rhonchi. No rales.   Chest:      Chest wall: Not tender to palpatation.   Cardiovascular:      PMI at left midclavicular line. Normal rate. Regular rhythm. Normal S1. Normal S2.       Murmurs: There is no murmur.       No gallop.  No click. No rub.   Pulses:     Intact distal pulses.   Edema:     Peripheral edema absent.   Abdominal:      General: Bowel sounds are normal.      Palpations: Abdomen is soft.      Tenderness: There is no abdominal tenderness.   Musculoskeletal: Normal range of motion.         General: No tenderness. Skin:     General: Skin is warm and dry.   Neurological:      General: No focal deficit present.      Mental Status: Alert and oriented to person, place and time.           ASSESSMENT AND PLAN    Clinical impression     1. Near syncope, resolved no recurrence  2. Palpitations, no recurrence  3. Supraventricular tachycardia, no recurrence  4. Vertigo, stable  5. PVCs stable  6. Never smoker  7. Loop recorder implantation removed  8. Ventricular tachycardia seen on Loop recorder interrogation. Status post ICD with device interrogation reviewed with patient during this office visit. 1 episode of ventricular arrhythmia seen on device interrogation August 2021. 8 seconds in duration. No ICD therapies  9. Status post dual-chamber ICD implantation for positive electrophysiology study. Removal of a loop recorder. Device interrogation reviewed with patient during this office visit    Plan-recommendations    Will continue with beta-blocker therapy.  From the electrophysiology standpoint she is doing well.  Will continue with follow-ups in my office every 6 months or sooner needed.    Follow device clinic as scheduled.    Risk factor modification and lifestyle modification discussed with patient. Diet , exercise and hydration discussed with patient.    I have personally review with patient during this office visit, laboratory data, echocardiogram results, stress test results, Holter-event monitor results prior and after the last electrophysiology visit. All questions has been answered.    Please excuse any errors in grammar or translation related to this dictation.  Voice recognition software was utilized to prepare  this document.        Scribe Attestation  By signing my name below, I, Ana Reyes LPN , Scribe   attest that this documentation has been prepared under the direction and in the presence of Alex Mirza MD.

## 2024-02-21 ENCOUNTER — OFFICE VISIT (OUTPATIENT)
Dept: PRIMARY CARE | Facility: CLINIC | Age: 74
End: 2024-02-21
Payer: MEDICARE

## 2024-02-21 VITALS
DIASTOLIC BLOOD PRESSURE: 72 MMHG | OXYGEN SATURATION: 94 % | WEIGHT: 175.8 LBS | HEART RATE: 68 BPM | BODY MASS INDEX: 28.25 KG/M2 | RESPIRATION RATE: 18 BRPM | HEIGHT: 66 IN | SYSTOLIC BLOOD PRESSURE: 134 MMHG

## 2024-02-21 DIAGNOSIS — M25.552 PAIN OF LEFT HIP: ICD-10-CM

## 2024-02-21 DIAGNOSIS — B02.9 HERPES ZOSTER WITHOUT COMPLICATION: Primary | ICD-10-CM

## 2024-02-21 DIAGNOSIS — M79.605 PAIN OF LEFT LOWER EXTREMITY: ICD-10-CM

## 2024-02-21 PROCEDURE — 1159F MED LIST DOCD IN RCRD: CPT | Performed by: FAMILY MEDICINE

## 2024-02-21 PROCEDURE — 1157F ADVNC CARE PLAN IN RCRD: CPT | Performed by: FAMILY MEDICINE

## 2024-02-21 PROCEDURE — 1036F TOBACCO NON-USER: CPT | Performed by: FAMILY MEDICINE

## 2024-02-21 PROCEDURE — 3008F BODY MASS INDEX DOCD: CPT | Performed by: FAMILY MEDICINE

## 2024-02-21 PROCEDURE — 1126F AMNT PAIN NOTED NONE PRSNT: CPT | Performed by: FAMILY MEDICINE

## 2024-02-21 PROCEDURE — 99213 OFFICE O/P EST LOW 20 MIN: CPT | Performed by: FAMILY MEDICINE

## 2024-02-21 RX ORDER — MELOXICAM 15 MG/1
15 TABLET ORAL DAILY PRN
Qty: 30 TABLET | Refills: 1 | Status: SHIPPED | OUTPATIENT
Start: 2024-02-21

## 2024-02-21 RX ORDER — VALACYCLOVIR HYDROCHLORIDE 1 G/1
1000 TABLET, FILM COATED ORAL 3 TIMES DAILY
Qty: 21 TABLET | Refills: 0 | Status: SHIPPED | OUTPATIENT
Start: 2024-02-21 | End: 2024-02-28

## 2024-02-21 RX ORDER — HYDROCODONE BITARTRATE AND ACETAMINOPHEN 5; 325 MG/1; MG/1
1 TABLET ORAL EVERY 8 HOURS PRN
Qty: 21 TABLET | Refills: 0 | Status: SHIPPED | OUTPATIENT
Start: 2024-02-21 | End: 2024-02-28

## 2024-02-21 NOTE — PROGRESS NOTES
Subjective   Patient ID: Juanita Patino is a 73 y.o. female who presents for Hip Pain and Leg Pain.  HPI    Patient presents in office today for left hip and leg pain  Ongoing x 5 days  Pain is 8-9/10  Patient states pain with walking  Patient admits this is a throbbing pain.   Patient admits to OTC tylenol and an old RX for meloxicam with no relief.   Patient admits that there are 3-4 red spots on her lower left calf.     Review of Systems  Constitutional:  no chills, no fever and no night sweats.  Eyes: no blurred vision and no eyesight problems.  ENT: no hearing loss, no nasal congestion, no hoarseness and no sore throat.  Neck: no mass (es) and no swelling.  Cardiovascular: no chest pain, no intermittent leg claudication, no lower extremity edema, no palpitation and no syncope.  Respiratory: no cough, no shortness of breath during exertion, no shortness of breath at rest and no wheezing.  Gastrointestinal: no abdominal pain, no blood in stools, no constipation, no diarrhea, no melena, no nausea, no rectal pain and no vomiting.  Genitourinary: no dysuria, no change in urinary frequency, no urinary hesitancy and no feelings of urinary urgency.  Musculoskeletal: no arthralgias, no back pain and no myalgias.  Integumentary: no new skin lesions and no rashes.  Neurological: no difficulty walking, no headache, no limb weakness, no numbness and no tingling.  Psychiatric/Behavioral: no anxiety, no depression, no anhedonia and no substance use disorders.  Endocrine: no recent weight gain and no recent weight loss.  Hematologic/Lymphatic: no tendency for easy bruising and no swollen glands    Objective   Physical Exam  Patient in with complaints of throbbing burning pain from the left hip down to the calf developed a rash on the calf this morning exam shows blistering erythematous rash consistent with herpes zoster.  She has no other rash of the leg but this may develop over the next few days so she is advised not to be  "concerned if that occurs.  He has never had the shingles vaccine.  Had chickenpox as a child.  Lungs clear cardiac and abdominal exams are benign.  /72   Pulse 68   Resp 18   Ht 1.676 m (5' 6\")   Wt 79.7 kg (175 lb 12.8 oz)   SpO2 94%   BMI 28.37 kg/m²     Lab Results   Component Value Date    WBC 7.1 05/20/2023    HGB 13.5 05/20/2023    HCT 42.6 05/20/2023    MCV 93 05/20/2023     05/20/2023       Assessment/Plan assessments reviewed zoster plan Valtrex 1 g 3 times daily for 7 days and follow-up if not improving.  Problem List Items Addressed This Visit          Musculoskeletal and Injuries    Pain, hip    Relevant Medications    meloxicam (Mobic) 15 mg tablet     Other Visit Diagnoses       Herpes zoster without complication    -  Primary    Relevant Medications    valACYclovir (Valtrex) 1 gram tablet    HYDROcodone-acetaminophen (Norco) 5-325 mg tablet    Pain of left lower extremity        Relevant Medications    meloxicam (Mobic) 15 mg tablet          "

## 2024-02-22 ENCOUNTER — TELEPHONE (OUTPATIENT)
Dept: PRIMARY CARE | Facility: CLINIC | Age: 74
End: 2024-02-22
Payer: MEDICARE

## 2024-02-22 NOTE — TELEPHONE ENCOUNTER
Called patient and she is aware.   She had one more question that she forgot to ask the first time. She wanted to know if she could take the pain med, Norco more frequently than it says on the bottle. The bottle says every 8 hours but she wanted to know if she could take it maybe every 6 hours because it seems to wear off around that hour  Please advise  Thanks

## 2024-02-22 NOTE — TELEPHONE ENCOUNTER
PATIENT CALLING, SHE SAW YOU YESTERDAY FOR SHINGLES. SHE HAS A FEW QUESTIONS    HOW LONG IS SHE CONTAGIOUS   WANTS TO KNOW IF THEY OPEN WHAT SHE SHOULD DO AND IF THERE IS ANY INTERACTIONS WITH HER MEDICATIONS.  SHE USUALLY DOES AN EPSON SALT FOOT BATH AND WANTS TO MAKE SURE THAT IS OK.    PLEASE ADVISE.

## 2024-04-11 ENCOUNTER — HOSPITAL ENCOUNTER (OUTPATIENT)
Dept: CARDIOLOGY | Age: 74
Discharge: HOME | End: 2024-04-11

## 2024-04-11 PROCEDURE — 93295 DEV INTERROG REMOTE 1/2/MLT: CPT | Performed by: INTERNAL MEDICINE

## 2024-04-15 DIAGNOSIS — Z95.810 ICD (IMPLANTABLE CARDIOVERTER-DEFIBRILLATOR) IN PLACE: ICD-10-CM

## 2024-04-15 NOTE — PROGRESS NOTES
Rec'd fax from [facility] device clinic requesting  order for services rendered.  Order entered for date of service rendered and x next year for routine device checks in-clinic and remotely or as directed by physician.

## 2024-06-28 ENCOUNTER — TELEPHONE (OUTPATIENT)
Dept: CARDIOLOGY | Facility: CLINIC | Age: 74
End: 2024-06-28
Payer: MEDICARE

## 2024-06-28 NOTE — TELEPHONE ENCOUNTER
Spoke with patient who states home device box is broke and a new one is to be delivered. Patient asking if she is to keep her apt with Alex Mirza M.D.  7/1/24. Advised patient to keep appt. Left voicemail with Ramon at [employer] [employer] to see if he would be available to do a device check in office.   Katherin Song MA

## 2024-07-01 ENCOUNTER — APPOINTMENT (OUTPATIENT)
Dept: CARDIOLOGY | Facility: CLINIC | Age: 74
End: 2024-07-01
Payer: MEDICARE

## 2024-07-01 VITALS
DIASTOLIC BLOOD PRESSURE: 70 MMHG | BODY MASS INDEX: 28.42 KG/M2 | HEART RATE: 69 BPM | SYSTOLIC BLOOD PRESSURE: 122 MMHG | WEIGHT: 176.8 LBS | HEIGHT: 66 IN

## 2024-07-01 DIAGNOSIS — I47.20 VENTRICULAR TACHYCARDIA (MULTI): ICD-10-CM

## 2024-07-01 DIAGNOSIS — I49.3 PVC (PREMATURE VENTRICULAR CONTRACTION): ICD-10-CM

## 2024-07-01 DIAGNOSIS — Z95.810 ICD (IMPLANTABLE CARDIOVERTER-DEFIBRILLATOR) IN PLACE: ICD-10-CM

## 2024-07-01 DIAGNOSIS — Z78.9 NEVER SMOKED TOBACCO: ICD-10-CM

## 2024-07-01 DIAGNOSIS — I42.8 NON-ISCHEMIC CARDIOMYOPATHY (MULTI): ICD-10-CM

## 2024-07-01 PROCEDURE — 3008F BODY MASS INDEX DOCD: CPT | Performed by: INTERNAL MEDICINE

## 2024-07-01 PROCEDURE — 1159F MED LIST DOCD IN RCRD: CPT | Performed by: INTERNAL MEDICINE

## 2024-07-01 PROCEDURE — 1157F ADVNC CARE PLAN IN RCRD: CPT | Performed by: INTERNAL MEDICINE

## 2024-07-01 PROCEDURE — 93283 PRGRMG EVAL IMPLANTABLE DFB: CPT | Performed by: INTERNAL MEDICINE

## 2024-07-01 PROCEDURE — 99214 OFFICE O/P EST MOD 30 MIN: CPT | Performed by: INTERNAL MEDICINE

## 2024-07-01 PROCEDURE — 1036F TOBACCO NON-USER: CPT | Performed by: INTERNAL MEDICINE

## 2024-07-01 RX ORDER — MULTIVITAMIN
1 TABLET ORAL
COMMUNITY
End: 2024-07-01

## 2024-07-01 RX ORDER — GLUC/MSM/COLGN2/HYAL/ANTIARTH3 375-375-20
27 TABLET ORAL
COMMUNITY
End: 2024-07-01

## 2024-07-01 NOTE — PROGRESS NOTES
CARDIOLOGY OFFICE VISIT      CHIEF COMPLAINT  Chief Complaint   Patient presents with    Follow-up     7m       HISTORY OF PRESENT ILLNESS  HPI  73 year-old female with a past medical history of syncopeâ€“near syncope. She was evaluated recently for possible bradyarrhythmia arrhythmia. He had an echocardiogram that shows normal left ventricular function. Cardiac catheterization showed minimal coronary artery disease. s/p Loop recorder implantation.       Her device was interrogated in October 14, 2019, and reported one episode of tachycardia on September 14, 2018 that was consistent with a wide complex arrhythmia at a rate of 200 bpm. This arrhythmia lasted for 6 seconds and it happened at 4 PM. Patient states that she was asymptomatic. She had a cardiac catheterization in November 2018 that showed mild disease with a left ventricular ejection fraction 40-45%. Her cardiac MRI also showed left ventricular ejection fraction 40-45% with no other significant infiltrative disorder and delayed enhancement was normal. And underwent an electrophysiology study in January 27, 2020 without any complications. During the electrophysiology started there was evidence of frequent polymorphic ventricular tachycardia and monomorphic ventricular tachycardia, sustained episodes. The dual-chamber ICD was implanted and also loop recorder was removed. Patient had Covidâ€“19 infection in April 2021. Patient had a history of normal coronary arteries per cardiac catheterization in 2019.    Since the last weeks visit, she states that so far she has been doing well.  She denies any chest pain or shortness breath or palpitations but she lost her  in May 2024.  She is still grieving from this.    Device interrogation performed today during this evaluation shows adequate sensing, capture and impedances of all leads.  There were 7 episodes of nonsustained ventricular tachycardia lasting up to 2 seconds of duration.  No other significant  findings.      Past Medical History  No past medical history on file.    Social History  Social History     Tobacco Use    Smoking status: Never    Smokeless tobacco: Never   Vaping Use    Vaping status: Never Used   Substance Use Topics    Alcohol use: Yes     Comment: Seldom    Drug use: Never       Family History     Family History   Problem Relation Name Age of Onset    Hypertension Mother      Other (myocardial infraction) Mother      Esophageal cancer Father          Allergies:  Allergies   Allergen Reactions    Codeine Nausea Only        Outpatient Medications:  Current Outpatient Medications   Medication Instructions    Boswellia mervin extract (BOSWELLIA MERVIN XT, BULK, MISC) 1 tablet, oral, Daily    cartilage/collagen/bor/hyalur (JOINT HEALTH ORAL) 1 tablet, oral, Daily, Take 1 daily    cholecalciferol (Vitamin D-3) 25 MCG (1000 UT) tablet 1 tablet, oral, Daily    coenzyme Q-10 100 mg, oral, Daily, Take as directed    cyanocobalamin/folic ac/vit B6 (FOLIC ACID-VIT B6-VIT B12 ORAL) 1 tablet, oral, Daily RT    iodine 150 mcg tablet 1 tablet, oral, Daily    MAGNESIUM ORAL 400 mg, oral, 2 times daily    meloxicam (MOBIC) 15 mg, oral, Daily PRN    metoprolol succinate XL (TOPROL-XL) 12.5 mg, oral, Every morning    turmeric root extract 500 mg tablet 1 tablet, oral, Daily          REVIEW OF SYSTEMS  Review of Systems   All other systems reviewed and are negative.        VITALS  Vitals:    07/01/24 0943   BP: 122/70   Pulse: 69       PHYSICAL EXAM  Constitutional:       Appearance: Healthy appearance. Not in distress.   Neck:      Vascular: No JVR. JVD normal.   Pulmonary:      Effort: Pulmonary effort is normal.      Breath sounds: Normal breath sounds. No wheezing. No rhonchi. No rales.   Chest:      Chest wall: Not tender to palpatation.   Cardiovascular:      PMI at left midclavicular line. Normal rate. Regular rhythm. Normal S1. Normal S2.       Murmurs: There is no murmur.      No gallop.  No click. No  rub.      Comments: Device left pectoral area. No hematoma or infection noted.     Pulses:     Intact distal pulses.   Edema:     Peripheral edema absent.   Abdominal:      General: Bowel sounds are normal.      Palpations: Abdomen is soft.      Tenderness: There is no abdominal tenderness.   Musculoskeletal: Normal range of motion.         General: No tenderness. Skin:     General: Skin is warm and dry.   Neurological:      General: No focal deficit present.      Mental Status: Alert and oriented to person, place and time.           ASSESSMENT AND PLAN  Clinical impression     1. Near syncope, resolved no recurrence  2. Palpitations, no recurrence  3. Supraventricular tachycardia, no recurrence  4. Vertigo, stable  5. PVCs stable  6. Never smoker  7. Loop recorder implantation removed  8. Ventricular tachycardia seen on Loop recorder interrogation. Status post ICD with device interrogation reviewed with patient during this office visit. 1 episode of ventricular arrhythmia seen on device interrogation August 2021. 8 seconds in duration. No ICD therapies  9. Status post dual-chamber ICD implantation for positive electrophysiology study. Removal of a loop recorder. Device interrogation reviewed with patient during this office visit        Plan recommendations    Patient is doing well from the electrophysiology standpoint.  Still burden of ventricular arrhythmias minimal.  Will continue with observation for now.    Continue with beta-blocker therapy.    No need for antiarrhythmic therapy at this time.    Follow device clinic as scheduled.    Follow my office every 6 months or sooner if needed.    Risk factor modification and lifestyle modification discussed with patient. Diet , exercise and hydration discussed with patient.    I have personally review with patient during this office visit, laboratory data, echocardiogram results, stress test results, Holter-event monitor results prior and after the last  electrophysiology visit. All questions has been answered.    Please excuse any errors in grammar or translation related to this dictation.  Voice recognition software was utilized to prepare this document.      Scribe Attestation  By signing my name below, I, Ana Reyes LPN , Scribe   attest that this documentation has been prepared under the direction and in the presence of Alex Mirza MD.

## 2024-07-11 ENCOUNTER — APPOINTMENT (OUTPATIENT)
Dept: CARDIOLOGY | Facility: CLINIC | Age: 74
End: 2024-07-11
Payer: MEDICARE

## 2024-07-11 VITALS
BODY MASS INDEX: 28.38 KG/M2 | DIASTOLIC BLOOD PRESSURE: 70 MMHG | SYSTOLIC BLOOD PRESSURE: 110 MMHG | WEIGHT: 176.6 LBS | HEIGHT: 66 IN | HEART RATE: 85 BPM

## 2024-07-11 DIAGNOSIS — I47.20 VENTRICULAR TACHYCARDIA (MULTI): ICD-10-CM

## 2024-07-11 DIAGNOSIS — I42.8 NON-ISCHEMIC CARDIOMYOPATHY (MULTI): ICD-10-CM

## 2024-07-11 DIAGNOSIS — Z95.810 ICD (IMPLANTABLE CARDIOVERTER-DEFIBRILLATOR) IN PLACE: ICD-10-CM

## 2024-07-11 DIAGNOSIS — E78.00 HYPERCHOLESTEROLEMIA: ICD-10-CM

## 2024-07-11 DIAGNOSIS — E78.5 DYSLIPIDEMIA: ICD-10-CM

## 2024-07-11 DIAGNOSIS — I49.3 PVC (PREMATURE VENTRICULAR CONTRACTION): ICD-10-CM

## 2024-07-11 PROCEDURE — 99214 OFFICE O/P EST MOD 30 MIN: CPT | Performed by: INTERNAL MEDICINE

## 2024-07-11 PROCEDURE — 3008F BODY MASS INDEX DOCD: CPT | Performed by: INTERNAL MEDICINE

## 2024-07-11 PROCEDURE — 1157F ADVNC CARE PLAN IN RCRD: CPT | Performed by: INTERNAL MEDICINE

## 2024-07-11 PROCEDURE — 1159F MED LIST DOCD IN RCRD: CPT | Performed by: INTERNAL MEDICINE

## 2024-07-11 ASSESSMENT — ENCOUNTER SYMPTOMS
LOSS OF SENSATION IN FEET: 0
OCCASIONAL FEELINGS OF UNSTEADINESS: 0

## 2024-07-11 NOTE — PROGRESS NOTES
Patient:  Juanita Patino  YOB: 1950  MRN: [id number]       HPI:       Juanita Patino is a 73 y.o. female who returns today for cardiac follow-up.  She underwent initial evaluation in May 2019 for longstanding PVC's and recurrent near syncope. She was told she had an irregular heart rhythm dating back to when she was about 9 years old. She was diagnosed with PVCs at that time. She underwent some cardiac evaluation that was unremarkable. She had an episode of syncope in 2015. She became very lightheaded and collapsed to the floor. [place] [facility] [place]ogram, stress testing, and cardiac catheterization were all reported to be normal with the exception of some minimal disease of one vessel. She had a recurrent episode of lightheadedness as well as some vertigo type symptoms in 2017. She did not pass out. A repeat Holter monitor showed PVCs. She was diagnosed with moderate obstructive sleep apnea in the past but has been intolerant of CPAP. A previous carotid ultrasound showed some minimal plaquing and less than 50% stenosis bilaterally. She does not have any history of essential hypertension, diabetes mellitus, or tobacco abuse. She has mild hyperlipidemia.     A NVELO monitor done in May 2019 showed a 15 beat run of narrow complex tachycardia. Dr. Mirza recommended implant of a loop recorder. She was noted to have wide-complex tachycardia in September 2019 at a rate of 200 bpm lasting for seconds. She was asymptomatic with that. A cardiac MRI performed on December 2, 2019 showed an LV ejection fraction of 40%, mild left ventricular enlargement, RV ejection fraction 56%, mild left atrial enlargement, mild mitral valve prolapse, 1+ mitral and tricuspid regurgitation. Cardiac catheterization November 13, 2019 showed an LV ejection fraction of 40 to 45% with minor atherosclerotic heart disease. No greater than 20% stenosis. She underwent an  electrophysiology study on January 27, 2020 and was noted to have frequent polymorphic ventricular tachycardia and monomorphic ventricular tachycardia. She underwent implant of a dual-chamber ICD and removal of the loop recorder. The device implanted was a [employer] Evera MRI XT. She has been maintained on Toprol-XL 25 mg daily.     She has been doing well since her last visit.  She is without any specific cardiovascular related complaints. She denies any recent chest pain or shortness breath. She denies any orthopnea, PND, or increasing peripheral edema. She denies any palpitations, lightheadedness, near-syncope, or syncope. She denies any fever, chills, or cough. She denies any nausea, vomiting, or diaphoresis. She denies any hemoptysis, hematemesis, melena, or hematochezia. She walks on a regular basis.  Lab studies dated October 10, 2023 showed a normal basic metabolic profile with exception of glucose 129.  Magnesium was 2.08.  CBC May 20, 2023 was normal.  LDL cholesterol 134.  Blood pressures are well-controlled.  She is currently free of any anginal or CHF symptoms.  She will be continued on her same medications.  Other details as noted below.     The above portion of this note was dictated by me using voice recognition software. I personally performed the services described in the documentation. The scribe entering the documentation below was in my presence. I affirm that the information is both accurate and complete.       Objective:     Vitals:    07/11/24 1120   BP: 110/70   Pulse: 85       Wt Readings from Last 4 Encounters:   07/11/24 80.1 kg (176 lb 9.6 oz)   07/01/24 80.2 kg (176 lb 12.8 oz)   02/21/24 79.7 kg (175 lb 12.8 oz)   12/21/23 78.5 kg (173 lb)       Allergies:     Allergies   Allergen Reactions    Codeine Nausea Only          Medications:     Current Outpatient Medications   Medication Instructions    Boswellia mervin extract (BOSWELLIA MERVIN XT, BULK, MISC) 1 tablet, oral, Daily     cartilage/collagen/bor/hyalur (JOINT HEALTH ORAL) 1 tablet, oral, Daily, Take 1 daily    cholecalciferol (Vitamin D-3) 25 MCG (1000 UT) tablet 1 tablet, oral, Daily    coenzyme Q-10 100 mg, oral, Daily, Take as directed    cyanocobalamin/folic ac/vit B6 (FOLIC ACID-VIT B6-VIT B12 ORAL) 1 tablet, oral, Daily RT    iodine 150 mcg tablet 1 tablet, oral, Daily    MAGNESIUM ORAL 400 mg, oral, 2 times daily    meloxicam (MOBIC) 15 mg, oral, Daily PRN    metoprolol succinate XL (TOPROL-XL) 12.5 mg, oral, Every morning    turmeric root extract 500 mg tablet 1 tablet, oral, Daily       Physical Examination:   GENERAL:  Well developed, well nourished, in no acute distress.  CHEST:  Symmetric and nontender.  NEURO/PSYCH:  Alert and oriented times three with approppriate behavior and responses.  NECK:  Supple, no JVD, no bruit.  LUNGS:  Clear to auscultation bilaterally, normal respiratory effort.  HEART:  Rate and rhythm regular with no evident murmur, no gallop appreciated.        There are no rubs, clicks or heaves.  EXTREMITIES:  Warm with good color, no clubbing or cyanosis.  There is no edema noted.  PERIPHERAL VASCULAR:  Pulses present and equally palpable; 2+ throughout.      Lab:     CBC:   Lab Results   Component Value Date    WBC 7.1 05/20/2023    RBC 4.59 05/20/2023    HGB 13.5 05/20/2023    HCT 42.6 05/20/2023     05/20/2023        CMP:    Lab Results   Component Value Date     10/10/2023    K 4.3 10/10/2023     10/10/2023    CO2 29 10/10/2023    BUN 19 10/10/2023    CREATININE 0.73 10/10/2023    GLUCOSE 129 (H) 10/10/2023    CALCIUM 9.9 10/10/2023       Lipid Profile:    Lab Results   Component Value Date    TRIG 54 05/20/2023    HDL 72.8 05/20/2023       BMP:  Lab Results   Component Value Date     10/10/2023     05/20/2023     12/16/2020     01/09/2020    K 4.3 10/10/2023    K 4.4 05/20/2023    K 4.0 12/16/2020    K 4.2 01/09/2020     10/10/2023      05/20/2023     12/16/2020    CL 99 01/09/2020    CO2 29 10/10/2023    CO2 28 05/20/2023    CO2 28 12/16/2020    CO2 28 01/09/2020    BUN 19 10/10/2023    BUN 21 05/20/2023    BUN 17 12/16/2020    BUN 12 01/09/2020    CREATININE 0.73 10/10/2023    CREATININE 0.71 05/20/2023    CREATININE 0.62 12/16/2020    CREATININE 0.60 01/09/2020       CBC:  Lab Results   Component Value Date    WBC 7.1 05/20/2023    WBC 8.3 12/16/2020    WBC 6.1 01/09/2020    RBC 4.59 05/20/2023    RBC 4.61 12/16/2020    RBC 4.68 01/09/2020    HGB 13.5 05/20/2023    HGB 14.0 12/16/2020    HGB 14.0 01/09/2020    HCT 42.6 05/20/2023    HCT 42.6 12/16/2020    HCT 43.4 01/09/2020    MCV 93 05/20/2023    MCV 92 12/16/2020    MCV 93 01/09/2020    MCHC 31.7 (L) 05/20/2023    MCHC 32.9 12/16/2020    MCHC 32.3 01/09/2020    RDW 13.3 05/20/2023    RDW 12.6 12/16/2020    RDW 12.4 01/09/2020     05/20/2023     12/16/2020     01/09/2020       Hepatic Function Panel:    Lab Results   Component Value Date    ALKPHOS 71 05/20/2023    ALT 29 05/20/2023    AST 26 05/20/2023    PROT 6.8 05/20/2023    BILITOT 0.4 05/20/2023       Magnesium:    Lab Results   Component Value Date    MG 2.08 10/10/2023         PT/INR:    Lab Results   Component Value Date    PROTIME 11.3 01/09/2020    INR 1.0 01/09/2020       Problem List:     Patient Active Problem List   Diagnosis    Asymptomatic bunion    Atypical chest pain    Callus of foot    Chronic lumbar radiculopathy    Lumbar spinal stenosis    Vertigo    Hypercholesterolemia    ICD (implantable cardioverter-defibrillator) in place    Near syncope    Non-ischemic cardiomyopathy (Multi)    Obstructive sleep apnea    Osteopenia    Pain, hip    Palpitations    PVC (premature ventricular contraction)    SOB (shortness of breath) on exertion    Status post placement of implantable loop recorder    Tendonitis of knee    Narrow complex tachycardia (CMS-HCC)    Ventricular tachycardia (Multi)    Ankle  edema    Burning sensation    Chest congestion    Chronic left-sided low back pain with left-sided sciatica    Dyslipidemia    Folliculitis    H/O percutaneous left heart catheterization    Increased urinary frequency    Overweight (BMI 25.0-29.9)    Prolapse of female pelvic organs    Symptoms of urinary tract infection    Trochanteric bursitis of left hip    Upper respiratory infection    Synovial cyst of lumbar facet joint    H/O syncope    Postmenopause       Asessment:     Problem List Items Addressed This Visit             ICD-10-CM    Hypercholesterolemia E78.00    Relevant Orders    Follow Up In Cardiology    Comprehensive Metabolic Panel    ICD (implantable cardioverter-defibrillator) in place Z95.810    Relevant Orders    Follow Up In Cardiology    Comprehensive Metabolic Panel    Non-ischemic cardiomyopathy (Multi) I42.8    Relevant Orders    Follow Up In Cardiology    Transthoracic Echo Complete    Comprehensive Metabolic Panel    PVC (premature ventricular contraction) I49.3    Relevant Orders    Follow Up In Cardiology    Comprehensive Metabolic Panel    Ventricular tachycardia (Multi) I47.20    Relevant Orders    Follow Up In Cardiology    Comprehensive Metabolic Panel    Dyslipidemia E78.5    Relevant Orders    Follow Up In Cardiology    Comprehensive Metabolic Panel

## 2024-07-22 ENCOUNTER — HOSPITAL ENCOUNTER (OUTPATIENT)
Dept: CARDIOLOGY | Age: 74
Discharge: HOME | End: 2024-07-22
Payer: MEDICARE

## 2024-07-22 PROCEDURE — 93295 DEV INTERROG REMOTE 1/2/MLT: CPT | Performed by: INTERNAL MEDICINE

## 2024-07-22 PROCEDURE — 93296 REM INTERROG EVL PM/IDS: CPT

## 2024-07-29 ENCOUNTER — ANCILLARY PROCEDURE (OUTPATIENT)
Dept: CARDIOLOGY | Facility: HOSPITAL | Age: 74
End: 2024-07-29
Payer: MEDICARE

## 2024-07-29 DIAGNOSIS — I42.8 NON-ISCHEMIC CARDIOMYOPATHY (MULTI): ICD-10-CM

## 2024-07-29 PROCEDURE — 93306 TTE W/DOPPLER COMPLETE: CPT

## 2024-07-29 PROCEDURE — 93306 TTE W/DOPPLER COMPLETE: CPT | Performed by: INTERNAL MEDICINE

## 2024-07-31 LAB
AORTIC VALVE MEAN GRADIENT: 3 MMHG
AORTIC VALVE PEAK VELOCITY: 1.14 M/S
AV PEAK GRADIENT: 5.2 MMHG
AVA (PEAK VEL): 2.28 CM2
AVA (VTI): 2.32 CM2
EJECTION FRACTION APICAL 4 CHAMBER: 61.4
EJECTION FRACTION: 53 %
LEFT VENTRICLE INTERNAL DIMENSION DIASTOLE: 5.17 CM (ref 3.5–6)
LEFT VENTRICULAR OUTFLOW TRACT DIAMETER: 2.1 CM
LV EJECTION FRACTION BIPLANE: 52 %
MITRAL VALVE E/A RATIO: 0.84
RIGHT VENTRICLE PEAK SYSTOLIC PRESSURE: 25.1 MMHG

## 2024-10-28 ENCOUNTER — HOSPITAL ENCOUNTER (OUTPATIENT)
Dept: CARDIOLOGY | Age: 74
Discharge: HOME | End: 2024-10-28
Payer: MEDICARE

## 2024-10-28 PROCEDURE — 93295 DEV INTERROG REMOTE 1/2/MLT: CPT | Performed by: INTERNAL MEDICINE

## 2024-10-28 PROCEDURE — 93296 REM INTERROG EVL PM/IDS: CPT

## 2024-10-28 PROCEDURE — 93297 REM INTERROG DEV EVAL ICPMS: CPT

## 2025-01-06 ENCOUNTER — APPOINTMENT (OUTPATIENT)
Dept: CARDIOLOGY | Facility: CLINIC | Age: 75
End: 2025-01-06
Payer: MEDICARE

## 2025-01-09 ENCOUNTER — APPOINTMENT (OUTPATIENT)
Dept: CARDIOLOGY | Facility: CLINIC | Age: 75
End: 2025-01-09
Payer: MEDICARE

## 2025-02-03 ENCOUNTER — HOSPITAL ENCOUNTER (OUTPATIENT)
Dept: CARDIOLOGY | Age: 75
Discharge: HOME | End: 2025-02-03
Payer: MEDICARE

## 2025-02-03 PROCEDURE — 93297 REM INTERROG DEV EVAL ICPMS: CPT

## 2025-02-03 PROCEDURE — 93295 DEV INTERROG REMOTE 1/2/MLT: CPT | Performed by: INTERNAL MEDICINE

## 2025-02-03 PROCEDURE — 93296 REM INTERROG EVL PM/IDS: CPT

## 2025-02-28 LAB
ALBUMIN SERPL-MCNC: 4.6 G/DL (ref 3.6–5.1)
ALP SERPL-CCNC: 63 U/L (ref 37–153)
ALT SERPL-CCNC: 23 U/L (ref 6–29)
ANION GAP SERPL CALCULATED.4IONS-SCNC: 10 MMOL/L (CALC) (ref 7–17)
AST SERPL-CCNC: 22 U/L (ref 10–35)
BILIRUB SERPL-MCNC: 0.6 MG/DL (ref 0.2–1.2)
BUN SERPL-MCNC: 17 MG/DL (ref 7–25)
CALCIUM SERPL-MCNC: 9.6 MG/DL (ref 8.6–10.4)
CHLORIDE SERPL-SCNC: 102 MMOL/L (ref 98–110)
CO2 SERPL-SCNC: 27 MMOL/L (ref 20–32)
CREAT SERPL-MCNC: 0.6 MG/DL (ref 0.6–1)
EGFRCR SERPLBLD CKD-EPI 2021: 95 ML/MIN/1.73M2
GLUCOSE SERPL-MCNC: 90 MG/DL (ref 65–99)
POTASSIUM SERPL-SCNC: 4.5 MMOL/L (ref 3.5–5.3)
PROT SERPL-MCNC: 6.9 G/DL (ref 6.1–8.1)
SODIUM SERPL-SCNC: 139 MMOL/L (ref 135–146)

## 2025-03-06 ENCOUNTER — APPOINTMENT (OUTPATIENT)
Dept: CARDIOLOGY | Facility: CLINIC | Age: 75
End: 2025-03-06
Payer: MEDICARE

## 2025-03-06 VITALS
HEIGHT: 66 IN | BODY MASS INDEX: 29.18 KG/M2 | SYSTOLIC BLOOD PRESSURE: 116 MMHG | WEIGHT: 181.6 LBS | DIASTOLIC BLOOD PRESSURE: 82 MMHG | HEART RATE: 80 BPM

## 2025-03-06 DIAGNOSIS — I47.20 VENTRICULAR TACHYCARDIA (MULTI): ICD-10-CM

## 2025-03-06 DIAGNOSIS — E78.00 HYPERCHOLESTEROLEMIA: ICD-10-CM

## 2025-03-06 DIAGNOSIS — I42.8 NON-ISCHEMIC CARDIOMYOPATHY (MULTI): ICD-10-CM

## 2025-03-06 DIAGNOSIS — E78.5 DYSLIPIDEMIA: ICD-10-CM

## 2025-03-06 DIAGNOSIS — I49.3 PVC (PREMATURE VENTRICULAR CONTRACTION): ICD-10-CM

## 2025-03-06 DIAGNOSIS — Z95.810 ICD (IMPLANTABLE CARDIOVERTER-DEFIBRILLATOR) IN PLACE: ICD-10-CM

## 2025-03-06 PROCEDURE — 99214 OFFICE O/P EST MOD 30 MIN: CPT | Performed by: INTERNAL MEDICINE

## 2025-03-06 PROCEDURE — 1036F TOBACCO NON-USER: CPT | Performed by: INTERNAL MEDICINE

## 2025-03-06 PROCEDURE — 3008F BODY MASS INDEX DOCD: CPT | Performed by: INTERNAL MEDICINE

## 2025-03-06 PROCEDURE — 1159F MED LIST DOCD IN RCRD: CPT | Performed by: INTERNAL MEDICINE

## 2025-03-06 PROCEDURE — 1157F ADVNC CARE PLAN IN RCRD: CPT | Performed by: INTERNAL MEDICINE

## 2025-03-06 RX ORDER — GLUCOSAMINE HCL 500 MG
TABLET ORAL DAILY
COMMUNITY

## 2025-03-06 RX ORDER — METOPROLOL SUCCINATE 25 MG/1
12.5 TABLET, EXTENDED RELEASE ORAL DAILY
Qty: 45 TABLET | Refills: 3 | Status: SHIPPED | OUTPATIENT
Start: 2025-03-06 | End: 2026-03-06

## 2025-03-06 NOTE — PROGRESS NOTES
Patient:  Juanita Patino  YOB: 1950  MRN: [id number]       HPI:       Juanita Patino is a 74 y.o. female who returns today for cardiac follow-up.   She underwent initial evaluation in May 2019 for longstanding PVC's and recurrent near syncope. She was told she had an irregular heart rhythm dating back to when she was about 9 years old. She was diagnosed with PVCs at that time. She underwent some cardiac evaluation that was unremarkable. She had an episode of syncope in 2015. She became very lightheaded and collapsed to the floor. She was hospitalized at [facility]. An echocardiogram, stress testing, and cardiac catheterization were all reported to be normal with the exception of some minimal disease of one vessel. She had a recurrent episode of lightheadedness as well as some vertigo type symptoms in 2017. She did not pass out. A repeat Holter monitor showed PVCs. She was diagnosed with moderate obstructive sleep apnea in the past but has been intolerant of CPAP. A previous carotid ultrasound showed some minimal plaquing and less than 50% stenosis bilaterally. She does not have any history of essential hypertension, diabetes mellitus, or tobacco abuse. She has mild hyperlipidemia.     A Gifi monitor done in May 2019 showed a 15 beat run of narrow complex tachycardia. Dr. Mirza recommended implant of a loop recorder. She was noted to have wide-complex tachycardia in September 2019 at a rate of 200 bpm lasting for seconds. She was asymptomatic with that. A cardiac MRI performed on December 2, 2019 showed an LV ejection fraction of 40%, mild left ventricular enlargement, RV ejection fraction 56%, mild left atrial enlargement, mild mitral valve prolapse, 1+ mitral and tricuspid regurgitation. Cardiac catheterization November 13, 2019 showed an LV ejection fraction of 40 to 45% with minor atherosclerotic heart disease. No greater than 20% stenosis. She underwent an  electrophysiology study on January 27, 2020 and was noted to have frequent polymorphic ventricular tachycardia and monomorphic ventricular tachycardia. She underwent implant of a dual-chamber ICD and removal of the loop recorder. The device implanted was a Medtronic Evera MRI XT. She has been maintained on Toprol-XL 25 mg daily.    A follow-up echocardiogram July 29, 2024 showed an improved LV ejection fraction of 50 to 55%.  Normal valvular structure and function.      She has been doing reasonably well since her last visit.  She is under some stress and continues to grieve the death of her  last May from lung cancer.  She notes that she does have a good support system with many family and friends living nearby.  She is without any specific cardiovascular related complaints. She denies any recent chest pain or shortness breath. She denies any orthopnea, PND, or increasing peripheral edema. She denies any palpitations, lightheadedness, near-syncope, or syncope. She denies any fever, chills, or cough. She denies any nausea, vomiting, or diaphoresis. She denies any hemoptysis, hematemesis, melena, or hematochezia. She walks on a regular basis.  Comprehensive metabolic profile on February 28, 2025 was reviewed and all values are within normal limits.  LDL cholesterol May 2023 was 134.  Her blood pressures are well-controlled.  She is currently free of any anginal or CHF symptoms.  Continue Toprol-XL 25 mg daily.  She takes several over-the-counter supplements.  She prefers to avoid any additional medication changes at this time.  Other details as noted below.       The above portion of this note was dictated by me using voice recognition software. I personally performed the services described in the documentation. The scribe entering the documentation below was in my presence. I affirm that the information is both accurate and complete.       Objective:     Vitals:    03/06/25 0918   BP: 116/82   Pulse: 80        Wt Readings from Last 4 Encounters:   07/11/24 80.1 kg (176 lb 9.6 oz)   07/01/24 80.2 kg (176 lb 12.8 oz)   02/21/24 79.7 kg (175 lb 12.8 oz)   12/21/23 78.5 kg (173 lb)       Allergies:     Allergies   Allergen Reactions    Codeine Nausea Only        Medications:     Current Outpatient Medications   Medication Instructions    Boswellia mervin extract (BOSWELLIA MERVIN XT, BULK, MISC) 1 tablet, oral, Daily    cartilage/collagen/bor/hyalur (JOINT HEALTH ORAL) 1 tablet, oral, Daily, Take 1 daily    cholecalciferol (Vitamin D-3) 25 MCG (1000 UT) tablet 1 tablet, oral, Daily    coenzyme Q-10 100 mg, oral, Daily, Take as directed    cyanocobalamin/folic ac/vit B6 (FOLIC ACID-VIT B6-VIT B12 ORAL) 1 tablet, oral, Daily RT    iodine 150 mcg tablet 1 tablet, oral, Daily    MAGNESIUM ORAL 400 mg, oral, 2 times daily    meloxicam (MOBIC) 15 mg, oral, Daily PRN    metoprolol succinate XL (Toprol-XL) 25 mg 24 hr tablet TAKE 1/2 (ONE-HALF) TABLET BY MOUTH ONCE DAILY IN THE MORNING    turmeric root extract 500 mg tablet 1 tablet, oral, Daily       Physical Examination:   GENERAL:  Well developed, well nourished, in no acute distress.  CHEST:  Symmetric and nontender.  NEURO/PSYCH:  Alert and oriented times three with approppriate behavior and responses.  NECK:  Supple, no JVD, no bruit.  LUNGS:  Clear to auscultation bilaterally, normal respiratory effort.  HEART:  Rate and rhythm regular with no evident murmur, no gallop appreciated.        There are no rubs, clicks or heaves.  EXTREMITIES:  Warm with good color, no clubbing or cyanosis.  There is no edema noted.  PERIPHERAL VASCULAR:  Pulses present and equally palpable; 2+ throughout.      Lab:     CBC:   Lab Results   Component Value Date    WBC 7.1 05/20/2023    RBC 4.59 05/20/2023    HGB 13.5 05/20/2023    HCT 42.6 05/20/2023     05/20/2023        CMP:    Lab Results   Component Value Date     02/28/2025    K 4.5 02/28/2025     02/28/2025    CO2  27 02/28/2025    BUN 17 02/28/2025    CREATININE 0.60 02/28/2025    GLUCOSE 90 02/28/2025    CALCIUM 9.6 02/28/2025       Lipid Profile:    Lab Results   Component Value Date    TRIG 54 05/20/2023    HDL 72.8 05/20/2023       BMP:  Lab Results   Component Value Date     02/28/2025     10/10/2023     05/20/2023     12/16/2020     01/09/2020    K 4.5 02/28/2025    K 4.3 10/10/2023    K 4.4 05/20/2023    K 4.0 12/16/2020    K 4.2 01/09/2020     02/28/2025     10/10/2023     05/20/2023     12/16/2020    CL 99 01/09/2020    CO2 27 02/28/2025    CO2 29 10/10/2023    CO2 28 05/20/2023    CO2 28 12/16/2020    CO2 28 01/09/2020    BUN 17 02/28/2025    BUN 19 10/10/2023    BUN 21 05/20/2023    BUN 17 12/16/2020    BUN 12 01/09/2020    CREATININE 0.60 02/28/2025    CREATININE 0.73 10/10/2023    CREATININE 0.71 05/20/2023    CREATININE 0.62 12/16/2020    CREATININE 0.60 01/09/2020       CBC:  Lab Results   Component Value Date    WBC 7.1 05/20/2023    WBC 8.3 12/16/2020    WBC 6.1 01/09/2020    RBC 4.59 05/20/2023    RBC 4.61 12/16/2020    RBC 4.68 01/09/2020    HGB 13.5 05/20/2023    HGB 14.0 12/16/2020    HGB 14.0 01/09/2020    HCT 42.6 05/20/2023    HCT 42.6 12/16/2020    HCT 43.4 01/09/2020    MCV 93 05/20/2023    MCV 92 12/16/2020    MCV 93 01/09/2020    MCHC 31.7 (L) 05/20/2023    MCHC 32.9 12/16/2020    MCHC 32.3 01/09/2020    RDW 13.3 05/20/2023    RDW 12.6 12/16/2020    RDW 12.4 01/09/2020     05/20/2023     12/16/2020     01/09/2020       Hepatic Function Panel:    Lab Results   Component Value Date    ALKPHOS 63 02/28/2025    ALT 23 02/28/2025    AST 22 02/28/2025    PROT 6.9 02/28/2025    BILITOT 0.6 02/28/2025       Magnesium:    Lab Results   Component Value Date    MG 2.08 10/10/2023       PT/INR:    Lab Results   Component Value Date    PROTIME 11.3 01/09/2020    INR 1.0 01/09/2020       Problem List:     Patient Active Problem List    Diagnosis    Asymptomatic bunion    Atypical chest pain    Callus of foot    Chronic lumbar radiculopathy    Lumbar spinal stenosis    Vertigo    Hypercholesterolemia    ICD (implantable cardioverter-defibrillator) in place    Near syncope    Non-ischemic cardiomyopathy (Multi)    Obstructive sleep apnea    Osteopenia    Pain, hip    Palpitations    PVC (premature ventricular contraction)    SOB (shortness of breath) on exertion    Status post placement of implantable loop recorder    Tendonitis of knee    Narrow complex tachycardia (CMS-HCC)    Ventricular tachycardia (Multi)    Ankle edema    Burning sensation    Chest congestion    Chronic left-sided low back pain with left-sided sciatica    Dyslipidemia    Folliculitis    H/O percutaneous left heart catheterization    Increased urinary frequency    Overweight (BMI 25.0-29.9)    Prolapse of female pelvic organs    Symptoms of urinary tract infection    Trochanteric bursitis of left hip    Upper respiratory infection    Synovial cyst of lumbar facet joint    H/O syncope    Postmenopause       Asessment:     Problem List Items Addressed This Visit             ICD-10-CM    Hypercholesterolemia E78.00    Relevant Orders    Follow Up In Cardiology    Basic Metabolic Panel    Magnesium    ICD (implantable cardioverter-defibrillator) in place Z95.810    Relevant Orders    Follow Up In Cardiology    Basic Metabolic Panel    Magnesium    Non-ischemic cardiomyopathy (Multi) I42.8    Relevant Medications    metoprolol succinate XL (Toprol-XL) 25 mg 24 hr tablet    Other Relevant Orders    Follow Up In Cardiology    Basic Metabolic Panel    Magnesium    PVC (premature ventricular contraction) I49.3    Relevant Medications    metoprolol succinate XL (Toprol-XL) 25 mg 24 hr tablet    Other Relevant Orders    Follow Up In Cardiology    Basic Metabolic Panel    Magnesium    Ventricular tachycardia (Multi) I47.20    Relevant Medications    metoprolol succinate XL (Toprol-XL) 25  mg 24 hr tablet    Other Relevant Orders    Follow Up In Cardiology    Basic Metabolic Panel    Magnesium    Dyslipidemia E78.5    Relevant Orders    Follow Up In Cardiology    Basic Metabolic Panel    Magnesium       Scribe Attestation  By signing my name below, I, Ada Grimes CMA   , Scribe   attest that this documentation has been prepared under the direction and in the presence of Alan Fletcher MD.

## 2025-03-06 NOTE — PATIENT INSTRUCTIONS
NON FASTING LABS, 5-7 DAYS PRIOR TO YOUR APPOINTMENT WITH DR LAGUNAS IN 6 MONTHS    DID YOU KNOW  We have a pharmacy here in the [facility].  They can fill all prescriptions, not just cardiac medications.  Prescriptions from other pharmacies can easily be transferred to the  pharmacy by the  pharmacist on site.   pharmacies offer FREE HOME DELIVERY on medications to anywhere in [place]. They can sync your medications. Typically prescriptions can be ready in 10 - 15 minutes. If pharmacy is unable to fill your  prescription or if cost is more than your paying now the Pharmacist can easily transfer back to your Pharmacy of choice. Pharmacy phone # [phone].     Please bring all medicines, vitamins, and herbal supplements with you in original bottles to every appointment! This is the best way to ensure your medication list in your chart is accurate.    Prescriptions will not be filled unless you are compliant with your follow up appointments or have a follow up appointment scheduled as per instruction of your physician. Refills should be requested at the time of your visit.

## 2025-04-23 NOTE — PROGRESS NOTES
"Subjective   Patient ID: Juanita Patino \"Marimar" is a 74 y.o. female who presents for Medicare Annual Wellness Visit Subsequent, Hypertension, and Hyperlipidemia.  HPI    Patient presents today for AWV, HTH, HLD follow up. She does not eat a low sodium, low cholesterol diet. Drinks caffeine once in a while. She does not exercise. She does not check her BP at home. Last eye exam was last May. Last dental exam was January. Is not fasting for BW today. Repeat .     Patient admits to mild swelling in the right ankle. Wears comfortable shoes.     Taking Vitamin D3 1000 units daily.    Patient reports a mole on her neck. Denies any history of basal cell carcinoma. She is wondering if she should see a dermatologist. We discussed it is just an in-grown hair.     Due for mammogram and colonoscopy.      Review of systems  ; Patient seen today for exam denies any problems with headaches or vision, denies any shortness of breath chest pain nausea or vomiting, no black stool no blood in the stool no heartburn type symptoms denies any problems with constipation or diarrhea, and no dysuria-type symptoms    The patient's allergies medications were reviewed with them today    The patient's social family and surgical history or also reviewed here today, along with her past medical history.     Objective     Alert and active in  no acute distress  HEENT TMs clear oropharynx negative nares clear no drainage noted neck supple  With no adenopathy   Heart regular rate and rhythm without murmur and no carotid bruits  Lungs- clear to auscultation bilaterally, no wheeze or rhonchi noted  Thyroid -negative masses or nodularity  Abdomen- soft times four quadrants , bowel sounds positive no masses or organomegaly, negative tenderness guarding or rebound    skin -left neck mole consistent with seborrheic keratosis otherwise unremarkable      /70 (BP Location: Right arm, Patient Position: Sitting, BP Cuff Size: Adult)   Pulse 64   " "Temp 36.6 °C (97.8 °F) (Temporal)   Ht 1.626 m (5' 4\")   Wt 78.7 kg (173 lb 9.6 oz)   SpO2 94%   BMI 29.80 kg/m²     Allergies[1]    Assessment/Plan   Problem List Items Addressed This Visit       Hypercholesterolemia    Relevant Orders    Lipid Panel    Comprehensive metabolic panel     Other Visit Diagnoses         Medicare annual wellness visit, subsequent    -  Primary      Essential hypertension        Relevant Orders    CBC and Auto Differential      Screening for colon cancer        Relevant Orders    Cologuard® colon cancer screening      BMI 29.0-29.9,adult          Tachycardia        Relevant Orders    Magnesium      Screening mammogram, encounter for        Relevant Orders    BI mammo bilateral screening tomosynthesis      Ingrown hair              Medicare wellness questionnaire reviewed in detail. Advanced Directives reviewed today, Importance of having Advance care planing discussed. Patient advised to provide the office with a copy if has not already done so. No problems with activities of daily living. Falls risks reviewed.     Patient is overall doing well with the current medication and supplements and will continue these.    Reviewed CMP from 2/28/25, unremarkable.     Recommended less caffeine.     Recommended checking carotids and aorta every 5-6 years.     Labs have been ordered, she/he will have these performed and we will contact her/him with results.  (CBC, CMP, Lipid, Magnesium)    The mole on her neck is consistent with in-grown hair.     Cologuard ordered today.    Mammogram ordered today.    Wait until next year to get Shingle booster, as she recently had shingles.   Recommended DTaP vaccine if travelling.   Recommended Prevnar-20 vaccine.     If anything worsens or changes please call us at once, follow up in the office as planned,       Scribe Attestation  By signing my name below, I, Sarah Lainez, Scribe   attest that this documentation has been prepared under the direction and " in the presence of Jeffrey Ross DO.    All medical record entries made by the Scribe were at my direction and personally dictated by me.   I have reviewed the chart and agree that the record accurately reflects my personal performance of the history, physical exam, discussion, and plan.         [1]   Allergies  Allergen Reactions    Codeine Nausea Only

## 2025-04-24 ENCOUNTER — APPOINTMENT (OUTPATIENT)
Dept: PRIMARY CARE | Facility: CLINIC | Age: 75
End: 2025-04-24
Payer: MEDICARE

## 2025-04-24 VITALS
SYSTOLIC BLOOD PRESSURE: 128 MMHG | WEIGHT: 173.6 LBS | BODY MASS INDEX: 29.64 KG/M2 | HEIGHT: 64 IN | DIASTOLIC BLOOD PRESSURE: 70 MMHG | HEART RATE: 64 BPM | TEMPERATURE: 97.8 F | OXYGEN SATURATION: 94 %

## 2025-04-24 DIAGNOSIS — Z00.00 MEDICARE ANNUAL WELLNESS VISIT, SUBSEQUENT: Primary | ICD-10-CM

## 2025-04-24 DIAGNOSIS — Z12.31 SCREENING MAMMOGRAM, ENCOUNTER FOR: ICD-10-CM

## 2025-04-24 DIAGNOSIS — Z12.11 SCREENING FOR COLON CANCER: ICD-10-CM

## 2025-04-24 DIAGNOSIS — R00.0 TACHYCARDIA: ICD-10-CM

## 2025-04-24 DIAGNOSIS — E78.00 HYPERCHOLESTEROLEMIA: ICD-10-CM

## 2025-04-24 DIAGNOSIS — L73.1 INGROWN HAIR: ICD-10-CM

## 2025-04-24 DIAGNOSIS — I10 ESSENTIAL HYPERTENSION: ICD-10-CM

## 2025-04-24 PROCEDURE — 1157F ADVNC CARE PLAN IN RCRD: CPT | Performed by: FAMILY MEDICINE

## 2025-04-24 PROCEDURE — G0439 PPPS, SUBSEQ VISIT: HCPCS | Performed by: FAMILY MEDICINE

## 2025-04-24 PROCEDURE — 1036F TOBACCO NON-USER: CPT | Performed by: FAMILY MEDICINE

## 2025-04-24 PROCEDURE — 3074F SYST BP LT 130 MM HG: CPT | Performed by: FAMILY MEDICINE

## 2025-04-24 PROCEDURE — 1159F MED LIST DOCD IN RCRD: CPT | Performed by: FAMILY MEDICINE

## 2025-04-24 PROCEDURE — 3078F DIAST BP <80 MM HG: CPT | Performed by: FAMILY MEDICINE

## 2025-04-24 PROCEDURE — 3008F BODY MASS INDEX DOCD: CPT | Performed by: FAMILY MEDICINE

## 2025-04-24 PROCEDURE — 1170F FXNL STATUS ASSESSED: CPT | Performed by: FAMILY MEDICINE

## 2025-04-24 PROCEDURE — 1123F ACP DISCUSS/DSCN MKR DOCD: CPT | Performed by: FAMILY MEDICINE

## 2025-04-24 ASSESSMENT — ACTIVITIES OF DAILY LIVING (ADL)
DRESSING: INDEPENDENT
DOING_HOUSEWORK: INDEPENDENT
TAKING_MEDICATION: INDEPENDENT
BATHING: INDEPENDENT
GROCERY_SHOPPING: INDEPENDENT
MANAGING_FINANCES: INDEPENDENT

## 2025-04-24 ASSESSMENT — PATIENT HEALTH QUESTIONNAIRE - PHQ9
2. FEELING DOWN, DEPRESSED OR HOPELESS: NOT AT ALL
SUM OF ALL RESPONSES TO PHQ9 QUESTIONS 1 AND 2: 0
1. LITTLE INTEREST OR PLEASURE IN DOING THINGS: NOT AT ALL

## 2025-04-28 ENCOUNTER — APPOINTMENT (OUTPATIENT)
Dept: CARDIOLOGY | Facility: CLINIC | Age: 75
End: 2025-04-28
Payer: MEDICARE

## 2025-04-28 VITALS
BODY MASS INDEX: 29.53 KG/M2 | SYSTOLIC BLOOD PRESSURE: 140 MMHG | WEIGHT: 173 LBS | HEIGHT: 64 IN | DIASTOLIC BLOOD PRESSURE: 80 MMHG

## 2025-04-28 DIAGNOSIS — I42.8 NON-ISCHEMIC CARDIOMYOPATHY (MULTI): ICD-10-CM

## 2025-04-28 DIAGNOSIS — Z78.9 NEVER SMOKED TOBACCO: ICD-10-CM

## 2025-04-28 DIAGNOSIS — Z95.810 ICD (IMPLANTABLE CARDIOVERTER-DEFIBRILLATOR) IN PLACE: ICD-10-CM

## 2025-04-28 DIAGNOSIS — I49.3 PVC (PREMATURE VENTRICULAR CONTRACTION): ICD-10-CM

## 2025-04-28 DIAGNOSIS — I47.20 VENTRICULAR TACHYCARDIA (MULTI): ICD-10-CM

## 2025-04-28 PROCEDURE — 1159F MED LIST DOCD IN RCRD: CPT | Performed by: INTERNAL MEDICINE

## 2025-04-28 PROCEDURE — 99214 OFFICE O/P EST MOD 30 MIN: CPT | Performed by: INTERNAL MEDICINE

## 2025-04-28 PROCEDURE — 3008F BODY MASS INDEX DOCD: CPT | Performed by: INTERNAL MEDICINE

## 2025-04-28 PROCEDURE — 1123F ACP DISCUSS/DSCN MKR DOCD: CPT | Performed by: INTERNAL MEDICINE

## 2025-04-28 PROCEDURE — 1157F ADVNC CARE PLAN IN RCRD: CPT | Performed by: INTERNAL MEDICINE

## 2025-04-28 PROCEDURE — 1036F TOBACCO NON-USER: CPT | Performed by: INTERNAL MEDICINE

## 2025-04-28 NOTE — PROGRESS NOTES
CARDIOLOGY OFFICE VISIT      CHIEF COMPLAINT  Chief Complaint   Patient presents with    Follow-up    Hyperlipidemia       HISTORY OF PRESENT ILLNESS  HPI  74 year-old female with a past medical history of syncopeâ€“near syncope. She was evaluated recently for possible bradyarrhythmia arrhythmia. He had an echocardiogram that shows normal left ventricular function. Cardiac catheterization showed minimal coronary artery disease. s/p Loop recorder implantation.       Her device was interrogated in October 14, 2019, and reported one episode of tachycardia on September 14, 2018 that was consistent with a wide complex arrhythmia at a rate of 200 bpm. This arrhythmia lasted for 6 seconds and it happened at 4 PM. Patient states that she was asymptomatic. She had a cardiac catheterization in November 2018 that showed mild disease with a left ventricular ejection fraction 40-45%. Her cardiac MRI also showed left ventricular ejection fraction 40-45% with no other significant infiltrative disorder and delayed enhancement was normal. And underwent an electrophysiology study in January 27, 2020 without any complications. During the electrophysiology started there was evidence of frequent polymorphic ventricular tachycardia and monomorphic ventricular tachycardia, sustained episodes. The dual-chamber ICD was implanted and also loop recorder was removed. Patient had Covidâ€“19 infection in April 2021. Patient had a history of normal coronary arteries per cardiac catheterization in 2019.    Since the last visit, she has been doing well.  Denies any symptoms of chest pain or shortness breath or palpitations.    Device interrogation in February 2025 shows dual-chamber ICD Medtronic with battery longevity 2.9 years.  There were 2 brief episodes of nonsustained ventricular tachycardia lasting up to 2 seconds of duration rate up to 230 bpm.  Asymptomatic.      Past Medical History  Medical History[1]    Social History  Social  History[2]    Family History   Family History[3]     Allergies:  RX Allergies[4]     Outpatient Medications:  Current Outpatient Medications   Medication Instructions    alpha lipoic acid 200 mg tablet Daily    Boswellia mervin extract (BOSWELLIA MERVIN XT, BULK, MISC) 1 tablet, Daily    cartilage/collagen/bor/hyalur (JOINT HEALTH ORAL) 1 tablet, Daily    cholecalciferol (Vitamin D-3) 25 MCG (1000 UT) tablet 1 tablet, Daily    coenzyme Q-10 100 mg, Daily    cyanocobalamin/folic ac/vit B6 (FOLIC ACID-VIT B6-VIT B12 ORAL) 1 tablet, Daily RT    iodine 150 mcg tablet 1 tablet, Daily    MAGNESIUM ORAL 400 mg, 2 times daily    meloxicam (MOBIC) 15 mg, oral, Daily PRN    metoprolol succinate XL (TOPROL-XL) 12.5 mg, oral, Daily    turmeric root extract 500 mg tablet 1 tablet, Daily          REVIEW OF SYSTEMS  Review of Systems   All other systems reviewed and are negative.        VITALS  Vitals:    04/28/25 1009   BP: 140/80       PHYSICAL EXAM  Constitutional:       Appearance: Healthy appearance. Not in distress.   Neck:      Vascular: No JVR. JVD normal.   Pulmonary:      Effort: Pulmonary effort is normal.      Breath sounds: Normal breath sounds. No wheezing. No rhonchi. No rales.   Chest:      Chest wall: Not tender to palpatation.   Cardiovascular:      PMI at left midclavicular line. Normal rate. Regular rhythm. Normal S1. Normal S2.       Murmurs: There is no murmur.      No gallop.  No click. No rub.      Comments: Device left pectoral area. No hematoma or infection noted.     Pulses:     Intact distal pulses.   Edema:     Peripheral edema absent.   Abdominal:      General: Bowel sounds are normal.      Palpations: Abdomen is soft.      Tenderness: There is no abdominal tenderness.   Musculoskeletal: Normal range of motion.         General: No tenderness. Skin:     General: Skin is warm and dry.   Neurological:      General: No focal deficit present.      Mental Status: Alert and oriented to person, place and  time.           ASSESSMENT AND PLAN    Clinical impression     1. Near syncope, resolved no recurrence  2. Palpitations, no recurrence  3. Supraventricular tachycardia, no recurrence  4. Vertigo, stable  5. PVCs stable  6. Never smoker  7. Loop recorder implantation removed  8. Ventricular tachycardia seen on Loop recorder interrogation. Status post ICD with device interrogation reviewed with patient during this office visit. 1 episode of ventricular arrhythmia seen on device interrogation August 2021. 8 seconds in duration. No ICD therapies  9. Status post dual-chamber ICD implantation for positive electrophysiology study. Removal of a loop recorder. Device interrogation reviewed with patient during this office visit      Recommendation    Patient is doing well from the electrophysiology standpoint we will continue with current medical therapy that includes beta-blockers.    Follow device clinic as scheduled    Follow my office every 6 months for a year or sooner if needed.    Risk factor modification and lifestyle modification discussed with patient. Diet , exercise and hydration discussed with patient.    I have personally review with patient during this office visit, laboratory data, echocardiogram results, stress test results, Holter-event monitor results prior and after the last electrophysiology visit. All questions has been answered.    Please excuse any errors in grammar or translation related to this dictation.  Voice recognition software was utilized to prepare this document.    I, Dr. Mirza, personally performed the services described in the documentation as scribed by the nurse in my presence, and confirm it is both accurate and complete.       Scribe Attestation  By signing my name below, IAna LPN , Scribe   attest that this documentation has been prepared under the direction and in the presence of Alex Mirza MD           [1] History reviewed. No pertinent past medical history.  [2]    Social History  Tobacco Use    Smoking status: Never    Smokeless tobacco: Never   Vaping Use    Vaping status: Never Used   Substance Use Topics    Alcohol use: Yes     Comment: Seldom    Drug use: Never   [3]   Family History  Problem Relation Name Age of Onset    Hypertension Mother      Other (myocardial infraction) Mother      Esophageal cancer Father     [4]   Allergies  Allergen Reactions    Codeine Nausea Only

## 2025-04-28 NOTE — PATIENT INSTRUCTIONS
Follow up 1 year    Keep device checks at [facility] device clinic as scheduled    DID YOU KNOW  We have a pharmacy here in the [facility].  They can fill all prescriptions, not just cardiac medications.  Prescriptions from other pharmacies can easily be transferred to the  pharmacy by the  pharmacist on site.   pharmacies offer FREE HOME DELIVERY on medications to anywhere in [place]. They can sync your medications. Typically prescriptions can be ready in 10 - 15 minutes. If pharmacy is unable to fill your  prescription or if cost is more than your paying now the Pharmacist can easily transfer back to your Pharmacy of choice. Pharmacy phone # [phone].     Please bring all medicines, vitamins, and herbal supplements with you in original bottles to every appointment!!!!    Prescriptions will not be filled unless you are compliant with your follow up appointments or have a follow up appointment scheduled as per instruction of your physician. Refills should be requested at the time of your visit.

## 2025-05-07 ENCOUNTER — HOSPITAL ENCOUNTER (OUTPATIENT)
Dept: CARDIOLOGY | Age: 75
Discharge: HOME | End: 2025-05-07

## 2025-05-07 LAB — NONINV COLON CA DNA+OCC BLD SCRN STL QL: NEGATIVE

## 2025-05-12 ENCOUNTER — HOSPITAL ENCOUNTER (OUTPATIENT)
Dept: CARDIOLOGY | Age: 75
Discharge: HOME | End: 2025-05-12
Payer: MEDICARE

## 2025-05-12 PROCEDURE — 93297 REM INTERROG DEV EVAL ICPMS: CPT

## 2025-05-12 PROCEDURE — 93283 PRGRMG EVAL IMPLANTABLE DFB: CPT | Performed by: INTERNAL MEDICINE

## 2025-05-12 PROCEDURE — 93283 PRGRMG EVAL IMPLANTABLE DFB: CPT

## 2025-06-17 ENCOUNTER — OFFICE VISIT (OUTPATIENT)
Dept: URGENT CARE | Age: 75
End: 2025-06-17
Payer: MEDICARE

## 2025-06-17 VITALS
HEART RATE: 73 BPM | SYSTOLIC BLOOD PRESSURE: 121 MMHG | HEIGHT: 65 IN | RESPIRATION RATE: 16 BRPM | DIASTOLIC BLOOD PRESSURE: 74 MMHG | BODY MASS INDEX: 28.32 KG/M2 | OXYGEN SATURATION: 93 % | TEMPERATURE: 96.7 F | WEIGHT: 170 LBS

## 2025-06-17 DIAGNOSIS — R19.7 DIARRHEA, UNSPECIFIED TYPE: ICD-10-CM

## 2025-06-17 DIAGNOSIS — H10.32 ACUTE CONJUNCTIVITIS OF LEFT EYE, UNSPECIFIED ACUTE CONJUNCTIVITIS TYPE: Primary | ICD-10-CM

## 2025-06-17 LAB
POC CORONAVIRUS SARS-COV-2 PCR: NEGATIVE
POC HUMAN RHINOVIRUS PCR: NEGATIVE
POC INFLUENZA A VIRUS PCR: NEGATIVE
POC INFLUENZA B VIRUS PCR: NEGATIVE
POC RESPIRATORY SYNCYTIAL VIRUS PCR: NEGATIVE

## 2025-06-17 RX ORDER — POLYMYXIN B SULFATE AND TRIMETHOPRIM 1; 10000 MG/ML; [USP'U]/ML
1 SOLUTION OPHTHALMIC EVERY 4 HOURS
Qty: 10 ML | Refills: 0 | Status: SHIPPED | OUTPATIENT
Start: 2025-06-17 | End: 2025-06-27

## 2025-06-17 ASSESSMENT — ENCOUNTER SYMPTOMS
EYE ITCHING: 1
HEADACHES: 1
DIARRHEA: 1
EYE DISCHARGE: 1

## 2025-06-17 NOTE — PROGRESS NOTES
"Subjective   Patient ID: Juanita Patino \"Marimar" is a 74 y.o. female. They present today with a chief complaint of Nasal Congestion (Drainage ), Diarrhea (X4 days), Generalized Body Aches, and Headache.    History of Present Illness    Diarrhea  Associated symptoms: headaches    Headache  Associated symptoms: congestion and diarrhea      Pt presents to urgent care with c/o    nasal congestion, diarrhea, body aches, headache, left eye drainage and itching.  Patient reports diarrhea seems to be resolving.  She has been increasing her oral fluids.  States congestion also seems to be gradually improving. Pt denies CP, SOB, palpitations, fevers, abd pain, n/v/d, sick contacts, recent travel.        Past Medical History  Allergies as of 06/17/2025 - Reviewed 06/17/2025   Allergen Reaction Noted    Codeine Nausea Only 05/02/2018       Prescriptions Prior to Admission[1]     Medical History[2]    Surgical History[3]     reports that she has never smoked. She has never used smokeless tobacco. She reports current alcohol use. She reports that she does not use drugs.    Review of Systems  Review of Systems   HENT:  Positive for congestion.    Eyes:  Positive for discharge and itching.   Gastrointestinal:  Positive for diarrhea.   Neurological:  Positive for headaches.                                  Objective    Vitals:    06/17/25 1028   BP: 121/74   BP Location: Left arm   Patient Position: Sitting   BP Cuff Size: Adult   Pulse: 73   Resp: 16   Temp: 35.9 °C (96.7 °F)   TempSrc: Temporal   SpO2: 93%   Weight: 77.1 kg (170 lb)   Height: 1.651 m (5' 5\")     No LMP recorded.    Physical Exam  Vitals and nursing note reviewed.   Constitutional:       General: She is not in acute distress.     Appearance: Normal appearance. She is not ill-appearing or toxic-appearing.   HENT:      Head: Atraumatic.      Right Ear: Tympanic membrane, ear canal and external ear normal.      Left Ear: Tympanic membrane, ear canal and external ear " normal.      Nose: Congestion present.      Mouth/Throat:      Mouth: Mucous membranes are moist.      Pharynx: Oropharynx is clear.   Eyes:      Extraocular Movements: Extraocular movements intact.      Conjunctiva/sclera: Conjunctivae normal.      Pupils: Pupils are equal, round, and reactive to light.   Cardiovascular:      Rate and Rhythm: Normal rate.   Pulmonary:      Effort: Pulmonary effort is normal.   Abdominal:      Tenderness: There is no abdominal tenderness. There is no guarding.   Skin:     General: Skin is warm and dry.   Neurological:      General: No focal deficit present.      Mental Status: She is alert and oriented to person, place, and time.   Psychiatric:         Mood and Affect: Mood normal.         Behavior: Behavior normal.         Thought Content: Thought content normal.         Procedures    Point of Care Test & Imaging Results from this visit  Results for orders placed or performed in visit on 06/17/25   POCT SPOTFIRE R/ST Panel Mini w/COVID ([facility]) manually resulted    Specimen: Swab   Result Value Ref Range    POC Sars-Cov-2 PCR Negative Negative    POC Respiratory Syncytial Virus PCR Negative Negative    POC Influenza A Virus PCR Negative Negative    POC Influenza B Virus PCR Negative Negative    POC Human Rhinovirus PCR Negative Negative      Imaging  No results found.    Cardiology, Vascular, and Other Imaging  No other imaging results found for the past 2 days      Diagnostic study results (if any) were reviewed by KEVIN Barnard.    Assessment/Plan   Allergies, medications, history, and pertinent labs/EKGs/Imaging reviewed by KEVIN Barnard.     Medical Decision Making  Spot fire test in urgent care today is negative for Covid, Influenza A/B, Rhinovirus, RSV.   Patient advised to increase oral fluids, take OTC probiotics for diarrhea.  Suspect viral GI cause as patient's symptoms.  Will give prescription Polytrim eyedrops for suspected conjunctivitis  given patient's reports of purulent drainage and crusting this morning when she woke up.At time of discharge patient was clinically well-appearing and HDS for outpatient management. The patient was educated regarding diagnosis, supportive care, OTC and Rx medications. The patient was given the opportunity to ask questions prior to discharge.  They verbalized understanding of my discussion of the plans for treatment, expected course, indications to return to  or seek further evaluation in ED, and the need for timely follow up as directed.        Orders and Diagnoses  Diagnoses and all orders for this visit:  Acute conjunctivitis of left eye, unspecified acute conjunctivitis type  -     polymyxin B sulf-trimethoprim (Polytrim) ophthalmic solution; Administer 1 drop into the left eye every 4 hours for 10 days.  Diarrhea, unspecified type  -     POCT SPOTFIRE R/ST Panel Mini w/COVID ([facility]) manually resulted      Medical Admin Record      Patient disposition: Home    Electronically signed by KEVIN Barnard  4:37 PM           [1] (Not in a hospital admission)   [2] No past medical history on file.  [3]   Past Surgical History:  Procedure Laterality Date    OTHER SURGICAL HISTORY  05/01/2021    Dilation and curettage    OTHER SURGICAL HISTORY  05/01/2021    Heart surgery    OTHER SURGICAL HISTORY  05/01/2021    Spinal surgery    OTHER SURGICAL HISTORY  05/01/2021    Ankle surgery    OTHER SURGICAL HISTORY  09/14/2021    Back surgery    OTHER SURGICAL HISTORY  09/14/2021    Cardiac catheterization    OTHER SURGICAL HISTORY  09/14/2021    Cardioverter defibrillator insertion    OTHER SURGICAL HISTORY  09/14/2021    Tooth extraction

## 2025-09-11 ENCOUNTER — APPOINTMENT (OUTPATIENT)
Dept: CARDIOLOGY | Facility: CLINIC | Age: 75
End: 2025-09-11
Payer: MEDICARE

## 2025-10-23 ENCOUNTER — APPOINTMENT (OUTPATIENT)
Dept: PRIMARY CARE | Facility: CLINIC | Age: 75
End: 2025-10-23
Payer: MEDICARE

## 2026-04-27 ENCOUNTER — APPOINTMENT (OUTPATIENT)
Dept: CARDIOLOGY | Facility: CLINIC | Age: 76
End: 2026-04-27
Payer: MEDICARE